# Patient Record
Sex: MALE | Race: WHITE | NOT HISPANIC OR LATINO
[De-identification: names, ages, dates, MRNs, and addresses within clinical notes are randomized per-mention and may not be internally consistent; named-entity substitution may affect disease eponyms.]

---

## 2018-11-20 ENCOUNTER — HOSPITAL (OUTPATIENT)
Dept: OTHER | Age: 58
End: 2018-11-20
Attending: INTERNAL MEDICINE

## 2018-11-20 ENCOUNTER — IMAGING SERVICES (OUTPATIENT)
Dept: OTHER | Age: 58
End: 2018-11-20

## 2018-11-28 ENCOUNTER — HOSPITAL (OUTPATIENT)
Dept: OTHER | Age: 58
End: 2018-11-28
Attending: INTERNAL MEDICINE

## 2018-11-28 ENCOUNTER — IMAGING SERVICES (OUTPATIENT)
Dept: OTHER | Age: 58
End: 2018-11-28

## 2021-05-10 DIAGNOSIS — R93.89 ABNORMAL CT SCAN, CHEST: Primary | ICD-10-CM

## 2021-05-10 DIAGNOSIS — R59.0 MEDIASTINAL LYMPHADENOPATHY: ICD-10-CM

## 2021-05-10 DIAGNOSIS — R91.8 LUNG INFILTRATE ON CT: ICD-10-CM

## 2021-05-13 ENCOUNTER — HOSPITAL ENCOUNTER (OUTPATIENT)
Dept: CT IMAGING | Age: 61
Discharge: HOME | End: 2021-05-13
Attending: INTERNAL MEDICINE

## 2021-05-13 DIAGNOSIS — R59.0 MEDIASTINAL LYMPHADENOPATHY: ICD-10-CM

## 2021-05-13 DIAGNOSIS — R91.8 LUNG INFILTRATE ON CT: ICD-10-CM

## 2021-05-13 DIAGNOSIS — R93.89 ABNORMAL CT SCAN, CHEST: ICD-10-CM

## 2021-05-13 LAB
CREAT SERPL-MCNC: 1 MG/DL (ref 0.67–1.17)
GFR SERPLBLD BASED ON 1.73 SQ M-ARVRAT: 81 ML/MIN/1.73M2

## 2021-05-13 PROCEDURE — 10002805 HB CONTRAST AGENT: Performed by: INTERNAL MEDICINE

## 2021-05-13 PROCEDURE — 71260 CT THORAX DX C+: CPT

## 2021-05-13 PROCEDURE — 82565 ASSAY OF CREATININE: CPT

## 2021-05-13 RX ADMIN — IOHEXOL 80 ML: 300 INJECTION, SOLUTION INTRAVENOUS at 17:13

## 2022-01-16 ENCOUNTER — APPOINTMENT (OUTPATIENT)
Dept: GENERAL RADIOLOGY | Facility: HOSPITAL | Age: 62
End: 2022-01-16
Attending: EMERGENCY MEDICINE
Payer: COMMERCIAL

## 2022-01-16 ENCOUNTER — HOSPITAL ENCOUNTER (EMERGENCY)
Facility: HOSPITAL | Age: 62
Discharge: HOME | End: 2022-01-16
Attending: EMERGENCY MEDICINE
Payer: COMMERCIAL

## 2022-01-16 VITALS
HEART RATE: 77 BPM | RESPIRATION RATE: 23 BRPM | HEIGHT: 71 IN | TEMPERATURE: 102 F | BODY MASS INDEX: 25.9 KG/M2 | WEIGHT: 185 LBS | SYSTOLIC BLOOD PRESSURE: 108 MMHG | DIASTOLIC BLOOD PRESSURE: 70 MMHG | OXYGEN SATURATION: 94 %

## 2022-01-16 DIAGNOSIS — U07.1 COVID-19 VIRUS INFECTION: Primary | ICD-10-CM

## 2022-01-16 DIAGNOSIS — R50.9 FEVER, UNSPECIFIED FEVER CAUSE: ICD-10-CM

## 2022-01-16 LAB
ANION GAP SERPL CALC-SCNC: 5 MMOL/L (ref 0–18)
BASOPHILS # BLD AUTO: 0.01 X10(3) UL (ref 0–0.2)
BASOPHILS NFR BLD AUTO: 0.2 %
BUN BLD-MCNC: 12 MG/DL (ref 7–18)
BUN/CREAT SERPL: 13 (ref 10–20)
CALCIUM BLD-MCNC: 8.5 MG/DL (ref 8.5–10.1)
CHLORIDE SERPL-SCNC: 106 MMOL/L (ref 98–112)
CO2 SERPL-SCNC: 29 MMOL/L (ref 21–32)
CREAT BLD-MCNC: 0.92 MG/DL
DEPRECATED RDW RBC AUTO: 39.5 FL (ref 35.1–46.3)
EOSINOPHIL # BLD AUTO: 0 X10(3) UL (ref 0–0.7)
EOSINOPHIL NFR BLD AUTO: 0 %
ERYTHROCYTE [DISTWIDTH] IN BLOOD BY AUTOMATED COUNT: 11.8 % (ref 11–15)
GLUCOSE BLD-MCNC: 119 MG/DL (ref 70–99)
HCT VFR BLD AUTO: 44.1 %
HGB BLD-MCNC: 14.7 G/DL
IMM GRANULOCYTES # BLD AUTO: 0 X10(3) UL (ref 0–1)
IMM GRANULOCYTES NFR BLD: 0 %
LYMPHOCYTES # BLD AUTO: 0.89 X10(3) UL (ref 1–4)
LYMPHOCYTES NFR BLD AUTO: 17.9 %
MCH RBC QN AUTO: 30.2 PG (ref 26–34)
MCHC RBC AUTO-ENTMCNC: 33.3 G/DL (ref 31–37)
MCV RBC AUTO: 90.7 FL
MONOCYTES # BLD AUTO: 0.38 X10(3) UL (ref 0.1–1)
MONOCYTES NFR BLD AUTO: 7.6 %
NEUTROPHILS # BLD AUTO: 3.69 X10 (3) UL (ref 1.5–7.7)
NEUTROPHILS # BLD AUTO: 3.69 X10(3) UL (ref 1.5–7.7)
NEUTROPHILS NFR BLD AUTO: 74.3 %
NT-PROBNP SERPL-MCNC: 97 PG/ML (ref ?–125)
OSMOLALITY SERPL CALC.SUM OF ELEC: 291 MOSM/KG (ref 275–295)
PLATELET # BLD AUTO: 234 10(3)UL (ref 150–450)
POTASSIUM SERPL-SCNC: 4.3 MMOL/L (ref 3.5–5.1)
RBC # BLD AUTO: 4.86 X10(6)UL
SODIUM SERPL-SCNC: 140 MMOL/L (ref 136–145)
TROPONIN I HIGH SENSITIVITY: 8 NG/L
WBC # BLD AUTO: 5 X10(3) UL (ref 4–11)

## 2022-01-16 PROCEDURE — 99284 EMERGENCY DEPT VISIT MOD MDM: CPT

## 2022-01-16 PROCEDURE — 93010 ELECTROCARDIOGRAM REPORT: CPT | Performed by: EMERGENCY MEDICINE

## 2022-01-16 PROCEDURE — 85025 COMPLETE CBC W/AUTO DIFF WBC: CPT | Performed by: EMERGENCY MEDICINE

## 2022-01-16 PROCEDURE — 80048 BASIC METABOLIC PNL TOTAL CA: CPT | Performed by: EMERGENCY MEDICINE

## 2022-01-16 PROCEDURE — 84484 ASSAY OF TROPONIN QUANT: CPT | Performed by: EMERGENCY MEDICINE

## 2022-01-16 PROCEDURE — 71045 X-RAY EXAM CHEST 1 VIEW: CPT | Performed by: EMERGENCY MEDICINE

## 2022-01-16 PROCEDURE — 36415 COLL VENOUS BLD VENIPUNCTURE: CPT

## 2022-01-16 PROCEDURE — 83880 ASSAY OF NATRIURETIC PEPTIDE: CPT | Performed by: EMERGENCY MEDICINE

## 2022-01-16 PROCEDURE — 93005 ELECTROCARDIOGRAM TRACING: CPT

## 2022-01-16 RX ORDER — ACETAMINOPHEN 500 MG
1000 TABLET ORAL ONCE
Status: COMPLETED | OUTPATIENT
Start: 2022-01-16 | End: 2022-01-16

## 2022-01-16 NOTE — ED PROVIDER NOTES
Patient Seen in: [facility] AND [facility] Emergency Department    History   Patient presents with:  Difficulty Breathing      HPI    57-year-old male presents the ER with complaints of low blood pressure and hypoxemia at home.   Patient states he has been having s normal.   Eyes:      Conjunctiva/sclera: Conjunctivae normal.      Pupils: Pupils are equal, round, and reactive to light. Cardiovascular:      Rate and Rhythm: Normal rate and regular rhythm. Heart sounds: Normal heart sounds.    Pulmonary:      Eff Rhythm  Reading: No ST deviation. Imaging Results Available and Reviewed while in ED: XR CHEST AP PORTABLE  (CPT=71045)    Result Date: 1/16/2022  CONCLUSION: No acute cardiopulmonary abnormality.     Dictated by (CST): Ming Medina MD on 1/16/2 worsen      Medications Prescribed:  There are no discharge medications for this patient.

## 2022-01-16 NOTE — ED INITIAL ASSESSMENT (HPI)
Patient arrives from home via [facility]. Patient states he took his blood pressure at home and it was \"low\". Dizziness started this AM.   EMS report /88 upon arrival. O2 sats 90%, O2 2L NC with WNL sats.    Patient on room air during as

## 2022-03-17 ENCOUNTER — TELEPHONE (OUTPATIENT)
Dept: GASTROENTEROLOGY | Facility: CLINIC | Age: 62
End: 2022-03-17

## 2022-03-17 ENCOUNTER — OFFICE VISIT (OUTPATIENT)
Dept: GASTROENTEROLOGY | Facility: CLINIC | Age: 62
End: 2022-03-17
Payer: COMMERCIAL

## 2022-03-17 VITALS
DIASTOLIC BLOOD PRESSURE: 78 MMHG | HEART RATE: 91 BPM | WEIGHT: 184 LBS | SYSTOLIC BLOOD PRESSURE: 118 MMHG | HEIGHT: 71 IN | BODY MASS INDEX: 25.76 KG/M2

## 2022-03-17 DIAGNOSIS — Z12.11 SCREENING FOR COLON CANCER: Primary | ICD-10-CM

## 2022-03-17 PROCEDURE — 99203 OFFICE O/P NEW LOW 30 MIN: CPT | Performed by: NURSE PRACTITIONER

## 2022-03-17 PROCEDURE — 3078F DIAST BP <80 MM HG: CPT | Performed by: NURSE PRACTITIONER

## 2022-03-17 PROCEDURE — 3008F BODY MASS INDEX DOCD: CPT | Performed by: NURSE PRACTITIONER

## 2022-03-17 PROCEDURE — 3074F SYST BP LT 130 MM HG: CPT | Performed by: NURSE PRACTITIONER

## 2022-03-17 RX ORDER — DOCUSATE SODIUM 100 MG/1
100 CAPSULE, LIQUID FILLED ORAL DAILY
COMMUNITY
Start: 2022-02-19

## 2022-03-17 RX ORDER — MONTELUKAST SODIUM 10 MG/1
10 TABLET ORAL DAILY
COMMUNITY
Start: 2022-01-26

## 2022-03-17 RX ORDER — FLUTICASONE FUROATE AND VILANTEROL TRIFENATATE 100; 25 UG/1; UG/1
POWDER RESPIRATORY (INHALATION)
COMMUNITY
Start: 2022-02-14

## 2022-03-17 RX ORDER — POLYETHYLENE GLYCOL 3350, SODIUM CHLORIDE, SODIUM BICARBONATE, POTASSIUM CHLORIDE 420; 11.2; 5.72; 1.48 G/4L; G/4L; G/4L; G/4L
POWDER, FOR SOLUTION ORAL
Qty: 4000 ML | Refills: 0 | Status: SHIPPED | OUTPATIENT
Start: 2022-03-17

## 2022-03-17 NOTE — TELEPHONE ENCOUNTER
Scheduled for:  Colonoscopy 94452  Provider Name:  Dr Praneeth Sidhu  Date:  08/17/2022  Location:  [facility]  Sedation:  mac  Time:  5949 (pt is aware to arrive at [facility])    Prep:  MAC  Meds/Allergies Reconciled?:  Karina/BLUE reviewed. Diagnosis with codes:  Colon cancer screening z12.11  Was patient informed to call insurance with codes (Y/N):  Y     Referral sent?:  NA  [facility] or [facility] Th  notified?:  I sent an electronic request to Endo Scheduling and received a confirmation today. Medication Orders:  Pt is aware to NOT take iron pills, herbal meds and diet supplements for 7 days before exam. Also to NOT take any form of alcohol, recreational drugs and any forms of ED meds 24 hours before exam.     Misc Orders:       Further instructions given by staff:  I discussed the prep intructions with the patient in office which  He verbally understood. Copy of instructions was handed to patient as well. Patient was also advised he will receive a call from PAT nurse 72-24 hours prior procedure to schedule Covid test done.

## 2022-03-17 NOTE — PATIENT INSTRUCTIONS
-Schedule colonoscopy w/ Dr. Annabelle Husain or Dr. Lion Phelps with IV twilight or MAC  Dx: screening, [address] [facility]   -Eligible for NE: Yes r/t BMI <40  -Prep: Split dose Colyte/TriLyte or equivalent  -Anti-platelets and anti-coagulants: none  -Diabetes meds: none    ** If MAC:    - HOLD ACE/ARBs the night before and/or the day of the procedure(s) - N/A   - NO alcohol, recreational drugs nor erectile dysfunction medications 24 hours before procedure(s)   - NO herbal supplements or weight loss medications (phentermine/Vyvanse/Adderall)  x 7 days prior to the procedure(s)    ** If MAC @ [facility] or IV twilight - continue all medications as prescribed    ** COVID-19 testing required 72 hours prior to procedure

## 2022-05-25 ENCOUNTER — HOSPITAL ENCOUNTER (EMERGENCY)
Facility: HOSPITAL | Age: 62
Discharge: HOME | End: 2022-05-25
Attending: EMERGENCY MEDICINE
Payer: COMMERCIAL

## 2022-05-25 ENCOUNTER — APPOINTMENT (OUTPATIENT)
Dept: CT IMAGING | Facility: HOSPITAL | Age: 62
End: 2022-05-25
Attending: EMERGENCY MEDICINE
Payer: COMMERCIAL

## 2022-05-25 VITALS
RESPIRATION RATE: 17 BRPM | HEART RATE: 64 BPM | OXYGEN SATURATION: 99 % | DIASTOLIC BLOOD PRESSURE: 76 MMHG | SYSTOLIC BLOOD PRESSURE: 138 MMHG | BODY MASS INDEX: 25 KG/M2 | WEIGHT: 180 LBS | TEMPERATURE: 97 F

## 2022-05-25 DIAGNOSIS — N20.1 URETEROLITHIASIS: Primary | ICD-10-CM

## 2022-05-25 LAB
ANION GAP SERPL CALC-SCNC: 8 MMOL/L (ref 0–18)
BASOPHILS # BLD AUTO: 0.07 X10(3) UL (ref 0–0.2)
BASOPHILS NFR BLD AUTO: 0.7 %
BILIRUB UR QL: NEGATIVE
BUN BLD-MCNC: 20 MG/DL (ref 7–18)
BUN/CREAT SERPL: 16 (ref 10–20)
CALCIUM BLD-MCNC: 9.5 MG/DL (ref 8.5–10.1)
CHLORIDE SERPL-SCNC: 106 MMOL/L (ref 98–112)
CLARITY UR: CLEAR
CO2 SERPL-SCNC: 25 MMOL/L (ref 21–32)
COLOR UR: YELLOW
CREAT BLD-MCNC: 1.25 MG/DL
DEPRECATED RDW RBC AUTO: 41.2 FL (ref 35.1–46.3)
EOSINOPHIL # BLD AUTO: 0.3 X10(3) UL (ref 0–0.7)
EOSINOPHIL NFR BLD AUTO: 3 %
ERYTHROCYTE [DISTWIDTH] IN BLOOD BY AUTOMATED COUNT: 12 % (ref 11–15)
GLUCOSE BLD-MCNC: 106 MG/DL (ref 70–99)
GLUCOSE UR-MCNC: NEGATIVE MG/DL
HCT VFR BLD AUTO: 40.8 %
HGB BLD-MCNC: 13.6 G/DL
HGB UR QL STRIP.AUTO: NEGATIVE
IMM GRANULOCYTES # BLD AUTO: 0.02 X10(3) UL (ref 0–1)
IMM GRANULOCYTES NFR BLD: 0.2 %
KETONES UR-MCNC: 20 MG/DL
LEUKOCYTE ESTERASE UR QL STRIP.AUTO: NEGATIVE
LYMPHOCYTES # BLD AUTO: 1.65 X10(3) UL (ref 1–4)
LYMPHOCYTES NFR BLD AUTO: 16.4 %
MCH RBC QN AUTO: 31.1 PG (ref 26–34)
MCHC RBC AUTO-ENTMCNC: 33.3 G/DL (ref 31–37)
MCV RBC AUTO: 93.2 FL
MONOCYTES # BLD AUTO: 0.76 X10(3) UL (ref 0.1–1)
MONOCYTES NFR BLD AUTO: 7.6 %
NEUTROPHILS # BLD AUTO: 7.24 X10 (3) UL (ref 1.5–7.7)
NEUTROPHILS # BLD AUTO: 7.24 X10(3) UL (ref 1.5–7.7)
NEUTROPHILS NFR BLD AUTO: 72.1 %
NITRITE UR QL STRIP.AUTO: NEGATIVE
OSMOLALITY SERPL CALC.SUM OF ELEC: 291 MOSM/KG (ref 275–295)
PH UR: 8 [PH] (ref 5–8)
PLATELET # BLD AUTO: 254 10(3)UL (ref 150–450)
POTASSIUM SERPL-SCNC: 4 MMOL/L (ref 3.5–5.1)
PROT UR-MCNC: NEGATIVE MG/DL
RBC # BLD AUTO: 4.38 X10(6)UL
RBC #/AREA URNS AUTO: >10 /HPF
SODIUM SERPL-SCNC: 139 MMOL/L (ref 136–145)
SP GR UR STRIP: 1.01 (ref 1–1.03)
UROBILINOGEN UR STRIP-ACNC: <2
VIT C UR-MCNC: 40 MG/DL
WBC # BLD AUTO: 10 X10(3) UL (ref 4–11)

## 2022-05-25 PROCEDURE — 99284 EMERGENCY DEPT VISIT MOD MDM: CPT

## 2022-05-25 PROCEDURE — 96361 HYDRATE IV INFUSION ADD-ON: CPT

## 2022-05-25 PROCEDURE — 96374 THER/PROPH/DIAG INJ IV PUSH: CPT

## 2022-05-25 PROCEDURE — 81001 URINALYSIS AUTO W/SCOPE: CPT | Performed by: EMERGENCY MEDICINE

## 2022-05-25 PROCEDURE — 85025 COMPLETE CBC W/AUTO DIFF WBC: CPT | Performed by: EMERGENCY MEDICINE

## 2022-05-25 PROCEDURE — 80048 BASIC METABOLIC PNL TOTAL CA: CPT | Performed by: EMERGENCY MEDICINE

## 2022-05-25 PROCEDURE — 74176 CT ABD & PELVIS W/O CONTRAST: CPT | Performed by: EMERGENCY MEDICINE

## 2022-05-25 RX ORDER — ONDANSETRON 4 MG/1
4 TABLET, ORALLY DISINTEGRATING ORAL EVERY 4 HOURS PRN
Qty: 10 TABLET | Refills: 0 | Status: SHIPPED | OUTPATIENT
Start: 2022-05-25 | End: 2022-06-01

## 2022-05-25 RX ORDER — TAMSULOSIN HYDROCHLORIDE 0.4 MG/1
0.4 CAPSULE ORAL DAILY
Qty: 7 CAPSULE | Refills: 0 | Status: SHIPPED | OUTPATIENT
Start: 2022-05-25 | End: 2022-06-01

## 2022-05-25 RX ORDER — IBUPROFEN 600 MG/1
600 TABLET ORAL EVERY 8 HOURS PRN
Qty: 30 TABLET | Refills: 0 | Status: SHIPPED | OUTPATIENT
Start: 2022-05-25 | End: 2022-06-01

## 2022-05-25 RX ORDER — KETOROLAC TROMETHAMINE 15 MG/ML
15 INJECTION, SOLUTION INTRAMUSCULAR; INTRAVENOUS ONCE
Status: COMPLETED | OUTPATIENT
Start: 2022-05-25 | End: 2022-05-25

## 2022-05-25 RX ORDER — HYDROCODONE BITARTRATE AND ACETAMINOPHEN 5; 325 MG/1; MG/1
1 TABLET ORAL EVERY 6 HOURS PRN
Qty: 10 TABLET | Refills: 0 | Status: SHIPPED | OUTPATIENT
Start: 2022-05-25 | End: 2022-06-01

## 2022-05-25 RX ORDER — MORPHINE SULFATE 4 MG/ML
4 INJECTION, SOLUTION INTRAMUSCULAR; INTRAVENOUS ONCE
Status: DISCONTINUED | OUTPATIENT
Start: 2022-05-25 | End: 2022-05-25

## 2022-06-18 ENCOUNTER — LAB ENCOUNTER (OUTPATIENT)
Dept: LAB | Facility: HOSPITAL | Age: 62
End: 2022-06-18
Attending: UROLOGY
Payer: COMMERCIAL

## 2022-06-18 DIAGNOSIS — Z12.5 SPECIAL SCREENING FOR MALIGNANT NEOPLASM OF PROSTATE: Primary | ICD-10-CM

## 2022-06-18 DIAGNOSIS — N20.0 URIC ACID NEPHROLITHIASIS: ICD-10-CM

## 2022-06-18 LAB
ANION GAP SERPL CALC-SCNC: 7 MMOL/L (ref 0–18)
BASOPHILS # BLD AUTO: 0.05 X10(3) UL (ref 0–0.2)
BASOPHILS NFR BLD AUTO: 0.8 %
BUN BLD-MCNC: 30 MG/DL (ref 7–18)
BUN/CREAT SERPL: 28 (ref 10–20)
CALCIUM BLD-MCNC: 9.3 MG/DL (ref 8.5–10.1)
CHLORIDE SERPL-SCNC: 108 MMOL/L (ref 98–112)
CO2 SERPL-SCNC: 27 MMOL/L (ref 21–32)
COMPLEXED PSA SERPL-MCNC: 0.88 NG/ML (ref ?–4)
CREAT BLD-MCNC: 1.07 MG/DL
DEPRECATED RDW RBC AUTO: 41.6 FL (ref 35.1–46.3)
EOSINOPHIL # BLD AUTO: 0.26 X10(3) UL (ref 0–0.7)
EOSINOPHIL NFR BLD AUTO: 4.2 %
ERYTHROCYTE [DISTWIDTH] IN BLOOD BY AUTOMATED COUNT: 11.9 % (ref 11–15)
FASTING STATUS PATIENT QL REPORTED: YES
GLUCOSE BLD-MCNC: 114 MG/DL (ref 70–99)
HCT VFR BLD AUTO: 42.7 %
HGB BLD-MCNC: 14 G/DL
IMM GRANULOCYTES # BLD AUTO: 0.01 X10(3) UL (ref 0–1)
IMM GRANULOCYTES NFR BLD: 0.2 %
LYMPHOCYTES # BLD AUTO: 2.12 X10(3) UL (ref 1–4)
LYMPHOCYTES NFR BLD AUTO: 34.3 %
MCH RBC QN AUTO: 30.8 PG (ref 26–34)
MCHC RBC AUTO-ENTMCNC: 32.8 G/DL (ref 31–37)
MCV RBC AUTO: 94.1 FL
MONOCYTES # BLD AUTO: 0.48 X10(3) UL (ref 0.1–1)
MONOCYTES NFR BLD AUTO: 7.8 %
NEUTROPHILS # BLD AUTO: 3.26 X10 (3) UL (ref 1.5–7.7)
NEUTROPHILS # BLD AUTO: 3.26 X10(3) UL (ref 1.5–7.7)
NEUTROPHILS NFR BLD AUTO: 52.7 %
OSMOLALITY SERPL CALC.SUM OF ELEC: 301 MOSM/KG (ref 275–295)
PLATELET # BLD AUTO: 248 10(3)UL (ref 150–450)
POTASSIUM SERPL-SCNC: 3.9 MMOL/L (ref 3.5–5.1)
RBC # BLD AUTO: 4.54 X10(6)UL
SODIUM SERPL-SCNC: 142 MMOL/L (ref 136–145)
WBC # BLD AUTO: 6.2 X10(3) UL (ref 4–11)

## 2022-06-18 PROCEDURE — 85025 COMPLETE CBC W/AUTO DIFF WBC: CPT

## 2022-06-18 PROCEDURE — 36415 COLL VENOUS BLD VENIPUNCTURE: CPT

## 2022-06-18 PROCEDURE — 80048 BASIC METABOLIC PNL TOTAL CA: CPT

## 2022-06-29 ENCOUNTER — HOSPITAL ENCOUNTER (OUTPATIENT)
Dept: ULTRASOUND IMAGING | Age: 62
Discharge: HOME | End: 2022-06-29
Attending: UROLOGY
Payer: COMMERCIAL

## 2022-06-29 ENCOUNTER — HOSPITAL ENCOUNTER (OUTPATIENT)
Dept: GENERAL RADIOLOGY | Age: 62
Discharge: HOME | End: 2022-06-29
Attending: UROLOGY
Payer: COMMERCIAL

## 2022-06-29 DIAGNOSIS — N20.0 KIDNEY STONE: ICD-10-CM

## 2022-06-29 PROCEDURE — 76775 US EXAM ABDO BACK WALL LIM: CPT | Performed by: UROLOGY

## 2022-06-29 PROCEDURE — 74018 RADEX ABDOMEN 1 VIEW: CPT | Performed by: UROLOGY

## 2022-08-17 ENCOUNTER — ANESTHESIA EVENT (OUTPATIENT)
Dept: ENDOSCOPY | Facility: HOSPITAL | Age: 62
End: 2022-08-17
Payer: COMMERCIAL

## 2022-08-17 ENCOUNTER — HOSPITAL ENCOUNTER (OUTPATIENT)
Facility: HOSPITAL | Age: 62
Setting detail: HOSPITAL OUTPATIENT SURGERY
Discharge: HOME | End: 2022-08-17
Attending: INTERNAL MEDICINE | Admitting: INTERNAL MEDICINE
Payer: COMMERCIAL

## 2022-08-17 ENCOUNTER — ANESTHESIA (OUTPATIENT)
Dept: ENDOSCOPY | Facility: HOSPITAL | Age: 62
End: 2022-08-17
Payer: COMMERCIAL

## 2022-08-17 VITALS
SYSTOLIC BLOOD PRESSURE: 121 MMHG | BODY MASS INDEX: 25.2 KG/M2 | DIASTOLIC BLOOD PRESSURE: 83 MMHG | TEMPERATURE: 97 F | RESPIRATION RATE: 18 BRPM | HEART RATE: 67 BPM | HEIGHT: 71 IN | WEIGHT: 180 LBS | OXYGEN SATURATION: 97 %

## 2022-08-17 DIAGNOSIS — Z12.11 SCREENING FOR COLON CANCER: ICD-10-CM

## 2022-08-17 PROBLEM — J45.50 SEVERE PERSISTENT ASTHMA  (CMD): Status: ACTIVE | Noted: 2022-08-17

## 2022-08-17 PROBLEM — J32.4 CHRONIC PANSINUSITIS: Status: ACTIVE | Noted: 2022-08-17

## 2022-08-17 PROBLEM — M19.90 OSTEOARTHRITIS: Status: ACTIVE | Noted: 2022-08-17

## 2022-08-17 PROBLEM — R59.0 MEDIASTINAL LYMPHADENOPATHY: Status: ACTIVE | Noted: 2022-08-17

## 2022-08-17 PROBLEM — Z91.09 ENVIRONMENTAL ALLERGIES: Status: ACTIVE | Noted: 2022-08-17

## 2022-08-17 PROBLEM — E78.5 HYPERLIPIDEMIA: Status: ACTIVE | Noted: 2022-08-17

## 2022-08-17 PROBLEM — I10 HYPERTENSION: Status: ACTIVE | Noted: 2022-08-17

## 2022-08-17 PROBLEM — L71.9 ROSACEA: Status: ACTIVE | Noted: 2022-08-17

## 2022-08-17 PROBLEM — R76.8 ELEVATED IGE LEVEL: Status: ACTIVE | Noted: 2022-08-17

## 2022-08-17 PROBLEM — R93.89 ABNORMAL CT OF THE CHEST: Status: ACTIVE | Noted: 2022-08-17

## 2022-08-17 PROCEDURE — 45385 COLONOSCOPY W/LESION REMOVAL: CPT | Performed by: INTERNAL MEDICINE

## 2022-08-17 PROCEDURE — 0DBN8ZX EXCISION OF SIGMOID COLON, VIA NATURAL OR ARTIFICIAL OPENING ENDOSCOPIC, DIAGNOSTIC: ICD-10-PCS | Performed by: INTERNAL MEDICINE

## 2022-08-17 PROCEDURE — 0DBP8ZX EXCISION OF RECTUM, VIA NATURAL OR ARTIFICIAL OPENING ENDOSCOPIC, DIAGNOSTIC: ICD-10-PCS | Performed by: INTERNAL MEDICINE

## 2022-08-17 RX ORDER — MONTELUKAST SODIUM 10 MG/1
10 TABLET ORAL DAILY
COMMUNITY
End: 2023-01-04 | Stop reason: SDUPTHER

## 2022-08-17 RX ORDER — SODIUM CHLORIDE, SODIUM LACTATE, POTASSIUM CHLORIDE, CALCIUM CHLORIDE 600; 310; 30; 20 MG/100ML; MG/100ML; MG/100ML; MG/100ML
INJECTION, SOLUTION INTRAVENOUS CONTINUOUS
Status: DISCONTINUED | OUTPATIENT
Start: 2022-08-17 | End: 2022-08-17

## 2022-08-17 RX ORDER — MULTIVIT WITH MINERALS/LUTEIN
1000 TABLET ORAL 2 TIMES DAILY
COMMUNITY

## 2022-08-17 RX ORDER — ALBUTEROL SULFATE 90 UG/1
2 AEROSOL, METERED RESPIRATORY (INHALATION) EVERY 4 HOURS PRN
COMMUNITY
End: 2022-11-14 | Stop reason: ALTCHOICE

## 2022-08-17 RX ORDER — FLUTICASONE FUROATE AND VILANTEROL 100; 25 UG/1; UG/1
1 POWDER RESPIRATORY (INHALATION) DAILY
COMMUNITY
End: 2023-01-04 | Stop reason: SDUPTHER

## 2022-08-17 RX ORDER — CETIRIZINE HYDROCHLORIDE 10 MG/1
10 TABLET ORAL DAILY
COMMUNITY

## 2022-08-17 RX ORDER — NALOXONE HYDROCHLORIDE 0.4 MG/ML
80 INJECTION, SOLUTION INTRAMUSCULAR; INTRAVENOUS; SUBCUTANEOUS AS NEEDED
Status: DISCONTINUED | OUTPATIENT
Start: 2022-08-17 | End: 2022-08-17

## 2022-08-17 RX ORDER — FLUTICASONE PROPIONATE 50 MCG
1 SPRAY, SUSPENSION (ML) NASAL 2 TIMES DAILY PRN
COMMUNITY

## 2022-08-17 RX ORDER — LIDOCAINE HYDROCHLORIDE 10 MG/ML
INJECTION, SOLUTION EPIDURAL; INFILTRATION; INTRACAUDAL; PERINEURAL AS NEEDED
Status: DISCONTINUED | OUTPATIENT
Start: 2022-08-17 | End: 2022-08-17 | Stop reason: SURG

## 2022-08-17 RX ADMIN — SODIUM CHLORIDE, SODIUM LACTATE, POTASSIUM CHLORIDE, CALCIUM CHLORIDE: 600; 310; 30; 20 INJECTION, SOLUTION INTRAVENOUS at 08:45:00

## 2022-08-17 RX ADMIN — SODIUM CHLORIDE, SODIUM LACTATE, POTASSIUM CHLORIDE, CALCIUM CHLORIDE: 600; 310; 30; 20 INJECTION, SOLUTION INTRAVENOUS at 08:20:00

## 2022-08-17 RX ADMIN — LIDOCAINE HYDROCHLORIDE 25 MG: 10 INJECTION, SOLUTION EPIDURAL; INFILTRATION; INTRACAUDAL; PERINEURAL at 08:22:00

## 2022-08-17 NOTE — OPERATIVE REPORT
[facility] Endoscopy Report      Date of Procedure:  08/17/22      Preoperative Diagnosis:  1. Colorectal cancer screening  2. Family history of intestinal cancer      Postoperative Diagnosis:  1. Colon polyps  2. Sigmoid colon diverticulosis      Procedure:    Colonoscopy with polypectomy      Surgeon:  Alexandria Light M.D. Anesthesia:  Monitored anesthesia care  Cecal withdrawal time: 15 minutes  EBL:  Insignificant      Brief History: This is a 58year old male who presents for a screening colonoscopy in the setting of a family history of \"intestinal cancer\" in his father. The patient's last colonoscopy was a little over 5 years prior. He has been asymptomatic from a lower gastrointestinal tract standpoint. Technique:  After informed consent, the patient was placed in the left lateral recumbent position. Digital rectal examination revealed no palpable intraluminal abnormalities. An Olympus variable stiffness 190 series HD colonoscope was inserted into the rectum and advanced under direct vision by following the lumen to the terminal ileum. The colon was examined upon withdrawal in the left lateral recumbent position. Findings:  The preparation of the colon was excellent. The terminal ileum was examined for 5 cm and visually normal.  The ileocecal valve was well preserved. The visualized colonic mucosa from the cecum to the anal verge was normal with an intact vascular pattern. There were #2 polyps seen within the colon which removed as follows:    1. In the mid sigmoid colon there was a 3-4 mm sessile polyp which was cold snare excised and retrieved. 2.  In the proximal rectum there was a 3 mm sessile polyp which was cold snare excised and retrieved. Both specimens were placed in the same container. Inspection of both sites revealed no evidence of ongoing bleeding. There were several small diverticula seen in the sigmoid colon without signs of complication. There were no other colonic polyps, mass lesions, vascular anomalies or signs of inflammation seen. Retroflexion in the rectum revealed no abnormalities. The procedure was well tolerated without immediate complication. Impression:  1. Diminutive rectosigmoid polyps  2. Uncomplicated sigmoid colon diverticulosis    Recommendations:  1. High-fiber diet. 2.  Follow-up biopsy results. 3.  Probable screening/surveillance colonoscopy in 5 years unless polyps harbor advanced histology.         Leonardo Dobson MD  8/17/2022

## 2022-08-19 ENCOUNTER — TELEPHONE (OUTPATIENT)
Dept: GASTROENTEROLOGY | Facility: CLINIC | Age: 62
End: 2022-08-19

## 2022-08-19 NOTE — TELEPHONE ENCOUNTER
Health maintenance updated. Last colonoscopy done 8/17/22. 5 year recall placed into Pt Outreach, next due on 8/17/27 per Dr. Donna Joshua.

## 2022-08-19 NOTE — TELEPHONE ENCOUNTER
----- Message from Tanna Palma MD sent at 8/19/2022  5:54 PM CDT -----  I spoke to the patient. He is feeling well. He had #2 subcentimeter hyperplastic polyps removed. I discussed the significance. I have recommended a high-fiber diet for diverticulosis and based on family history a screening colonoscopy in 5 years. GI RNs: Please enter colonoscopy recall for 5 years.

## 2022-09-28 ENCOUNTER — OFFICE VISIT (OUTPATIENT)
Dept: AUDIOLOGY | Facility: CLINIC | Age: 62
End: 2022-09-28

## 2022-09-28 ENCOUNTER — OFFICE VISIT (OUTPATIENT)
Dept: OTOLARYNGOLOGY | Facility: CLINIC | Age: 62
End: 2022-09-28

## 2022-09-28 VITALS — WEIGHT: 180 LBS | HEIGHT: 71 IN | BODY MASS INDEX: 25.2 KG/M2

## 2022-09-28 DIAGNOSIS — H93.19 TINNITUS, UNSPECIFIED LATERALITY: ICD-10-CM

## 2022-09-28 DIAGNOSIS — J34.2 NASAL SEPTAL DEVIATION: ICD-10-CM

## 2022-09-28 DIAGNOSIS — H93.13 TINNITUS OF BOTH EARS: Primary | ICD-10-CM

## 2022-09-28 DIAGNOSIS — H91.93 BILATERAL HEARING LOSS, UNSPECIFIED HEARING LOSS TYPE: ICD-10-CM

## 2022-09-28 DIAGNOSIS — H90.3 SENSORINEURAL HEARING LOSS, BILATERAL: ICD-10-CM

## 2022-09-28 DIAGNOSIS — J32.9 PURULENT POSTNASAL DRAINAGE: ICD-10-CM

## 2022-09-28 PROCEDURE — 92567 TYMPANOMETRY: CPT | Performed by: AUDIOLOGIST

## 2022-09-28 PROCEDURE — 92557 COMPREHENSIVE HEARING TEST: CPT | Performed by: AUDIOLOGIST

## 2022-09-28 PROCEDURE — 3008F BODY MASS INDEX DOCD: CPT | Performed by: SPECIALIST

## 2022-09-28 PROCEDURE — 99213 OFFICE O/P EST LOW 20 MIN: CPT | Performed by: SPECIALIST

## 2022-09-28 RX ORDER — ALBUTEROL SULFATE 90 UG/1
2 AEROSOL, METERED RESPIRATORY (INHALATION) EVERY 4 HOURS PRN
COMMUNITY

## 2022-09-28 RX ORDER — FLUTICASONE PROPIONATE 50 MCG
2 SPRAY, SUSPENSION (ML) NASAL DAILY
Qty: 15 ML | Refills: 3 | Status: SHIPPED | OUTPATIENT
Start: 2022-09-28

## 2022-09-28 RX ORDER — CEFDINIR 300 MG/1
300 CAPSULE ORAL 2 TIMES DAILY
Qty: 28 CAPSULE | Refills: 0 | Status: SHIPPED | OUTPATIENT
Start: 2022-09-28

## 2022-09-28 NOTE — PATIENT INSTRUCTIONS
Audiogram was done to better assess your hearing loss. Your audiogram shows: Lateral mid to high-frequency mild to moderate hearing loss. Word discrimination is excellent in both ears. I placed you on a trial of cefdinir and Flonase for your sinus problems. Follow-up in 3 weeks time, sooner if problems.

## 2022-09-29 PROBLEM — H90.3 SENSORINEURAL HEARING LOSS, BILATERAL: Status: ACTIVE | Noted: 2022-09-29

## 2022-10-19 ENCOUNTER — OFFICE VISIT (OUTPATIENT)
Dept: OTOLARYNGOLOGY | Facility: CLINIC | Age: 62
End: 2022-10-19
Payer: COMMERCIAL

## 2022-10-19 DIAGNOSIS — G47.33 OBSTRUCTIVE SLEEP APNEA: ICD-10-CM

## 2022-10-19 DIAGNOSIS — J32.9 PURULENT POSTNASAL DRAINAGE: Primary | ICD-10-CM

## 2022-10-19 DIAGNOSIS — H90.3 SENSORINEURAL HEARING LOSS, BILATERAL: ICD-10-CM

## 2022-10-19 PROCEDURE — 99213 OFFICE O/P EST LOW 20 MIN: CPT | Performed by: SPECIALIST

## 2022-10-19 NOTE — PATIENT INSTRUCTIONS
You have a resolution of your sinus infection. Consider repeat sleep study for your sleep apnea. Consider binaural hearing aids.

## 2022-11-14 ENCOUNTER — OFFICE VISIT (OUTPATIENT)
Dept: PULMONOLOGY | Age: 62
End: 2022-11-14

## 2022-11-14 VITALS
DIASTOLIC BLOOD PRESSURE: 61 MMHG | HEIGHT: 71 IN | HEART RATE: 89 BPM | BODY MASS INDEX: 23.95 KG/M2 | RESPIRATION RATE: 16 BRPM | OXYGEN SATURATION: 98 % | WEIGHT: 171.08 LBS | SYSTOLIC BLOOD PRESSURE: 112 MMHG | TEMPERATURE: 98.2 F

## 2022-11-14 DIAGNOSIS — R76.8 ELEVATED IGE LEVEL: ICD-10-CM

## 2022-11-14 DIAGNOSIS — Z91.09 ENVIRONMENTAL ALLERGIES: ICD-10-CM

## 2022-11-14 DIAGNOSIS — M19.90 ARTHRITIS: ICD-10-CM

## 2022-11-14 DIAGNOSIS — J32.9 CHRONIC RHINOSINUSITIS: ICD-10-CM

## 2022-11-14 DIAGNOSIS — J45.50 SEVERE PERSISTENT ASTHMA WITHOUT COMPLICATION: Primary | ICD-10-CM

## 2022-11-14 PROCEDURE — 3078F DIAST BP <80 MM HG: CPT | Performed by: INTERNAL MEDICINE

## 2022-11-14 PROCEDURE — 99213 OFFICE O/P EST LOW 20 MIN: CPT | Performed by: INTERNAL MEDICINE

## 2022-11-14 PROCEDURE — 3074F SYST BP LT 130 MM HG: CPT | Performed by: INTERNAL MEDICINE

## 2022-11-14 RX ORDER — FLUTICASONE PROPIONATE 50 MCG
2 SPRAY, SUSPENSION (ML) NASAL
COMMUNITY
Start: 2022-09-28

## 2022-11-14 ASSESSMENT — PAIN SCALES - GENERAL: PAINLEVEL: 0

## 2022-12-27 RX ORDER — FLUTICASONE PROPIONATE 50 MCG
SPRAY, SUSPENSION (ML) NASAL
Qty: 48 ML | Refills: 1 | Status: SHIPPED | OUTPATIENT
Start: 2022-12-27

## 2023-01-04 ENCOUNTER — TELEPHONE (OUTPATIENT)
Dept: PULMONOLOGY | Age: 63
End: 2023-01-04

## 2023-01-04 DIAGNOSIS — J45.50 SEVERE PERSISTENT ASTHMA WITHOUT COMPLICATION: Primary | ICD-10-CM

## 2023-01-04 RX ORDER — FLUTICASONE FUROATE AND VILANTEROL 100; 25 UG/1; UG/1
1 POWDER RESPIRATORY (INHALATION) DAILY
Qty: 60 EACH | Refills: 3 | Status: SHIPPED | OUTPATIENT
Start: 2023-01-04 | End: 2023-01-11 | Stop reason: ALTCHOICE

## 2023-01-04 RX ORDER — MONTELUKAST SODIUM 10 MG/1
10 TABLET ORAL DAILY
Qty: 30 TABLET | Refills: 11 | Status: SHIPPED | OUTPATIENT
Start: 2023-01-04 | End: 2023-05-15 | Stop reason: SDUPTHER

## 2023-01-05 ENCOUNTER — TELEPHONE (OUTPATIENT)
Dept: PULMONOLOGY | Age: 63
End: 2023-01-05

## 2023-01-11 RX ORDER — FLUTICASONE PROPIONATE AND SALMETEROL 250; 50 UG/1; UG/1
1 POWDER RESPIRATORY (INHALATION)
Qty: 3 EACH | Refills: 3 | Status: SHIPPED | OUTPATIENT
Start: 2023-01-11 | End: 2023-05-08 | Stop reason: SDUPTHER

## 2023-01-13 ENCOUNTER — TELEPHONE (OUTPATIENT)
Dept: PULMONOLOGY | Age: 63
End: 2023-01-13

## 2023-01-13 DIAGNOSIS — J45.51 SEVERE PERSISTENT ASTHMA WITH ACUTE EXACERBATION: Primary | ICD-10-CM

## 2023-01-13 DIAGNOSIS — J45.50 SEVERE PERSISTENT ASTHMA WITHOUT COMPLICATION: ICD-10-CM

## 2023-01-13 RX ORDER — PREDNISONE 10 MG/1
40 TABLET ORAL DAILY
Qty: 20 TABLET | Refills: 0 | Status: SHIPPED | OUTPATIENT
Start: 2023-01-13 | End: 2023-01-18

## 2023-02-14 ENCOUNTER — HOSPITAL ENCOUNTER (OUTPATIENT)
Dept: CT IMAGING | Age: 63
Discharge: HOME | End: 2023-02-14
Attending: UROLOGY
Payer: COMMERCIAL

## 2023-02-14 ENCOUNTER — HOSPITAL ENCOUNTER (OUTPATIENT)
Dept: ULTRASOUND IMAGING | Facility: HOSPITAL | Age: 63
Discharge: HOME | End: 2023-02-14
Payer: COMMERCIAL

## 2023-02-14 DIAGNOSIS — N20.0 KIDNEY STONE: ICD-10-CM

## 2023-02-14 DIAGNOSIS — R94.5 ABNORMAL FINDING ON LIVER FUNCTION: ICD-10-CM

## 2023-02-14 LAB
CREAT BLD-MCNC: 1.2 MG/DL
GFR SERPLBLD BASED ON 1.73 SQ M-ARVRAT: 68 ML/MIN/1.73M2 (ref 60–?)

## 2023-02-14 PROCEDURE — 76705 ECHO EXAM OF ABDOMEN: CPT

## 2023-02-14 PROCEDURE — 82565 ASSAY OF CREATININE: CPT

## 2023-02-14 PROCEDURE — 76705 ECHO EXAM OF ABDOMEN: CPT | Performed by: UROLOGY

## 2023-02-14 PROCEDURE — 74178 CT ABD&PLV WO CNTR FLWD CNTR: CPT | Performed by: UROLOGY

## 2023-05-07 DIAGNOSIS — J45.50 SEVERE PERSISTENT ASTHMA WITHOUT COMPLICATION: ICD-10-CM

## 2023-05-08 RX ORDER — FLUTICASONE PROPIONATE AND SALMETEROL 250; 50 UG/1; UG/1
1 POWDER RESPIRATORY (INHALATION)
Qty: 3 EACH | Refills: 3 | Status: SHIPPED | OUTPATIENT
Start: 2023-05-08 | End: 2023-05-15 | Stop reason: ALTCHOICE

## 2023-05-08 RX ORDER — FLUTICASONE FUROATE AND VILANTEROL TRIFENATATE 100; 25 UG/1; UG/1
POWDER RESPIRATORY (INHALATION)
Qty: 60 EACH | Refills: 3 | Status: SHIPPED | OUTPATIENT
Start: 2023-05-08 | End: 2023-05-08 | Stop reason: ALTCHOICE

## 2023-05-15 RX ORDER — MONTELUKAST SODIUM 10 MG/1
10 TABLET ORAL DAILY
Qty: 90 TABLET | Refills: 3 | Status: SHIPPED | OUTPATIENT
Start: 2023-05-15

## 2023-05-15 RX ORDER — FLUTICASONE FUROATE AND VILANTEROL 100; 25 UG/1; UG/1
1 POWDER RESPIRATORY (INHALATION)
Qty: 3 EACH | Refills: 1 | Status: SHIPPED | OUTPATIENT
Start: 2023-05-15

## 2023-05-18 ENCOUNTER — APPOINTMENT (OUTPATIENT)
Dept: PULMONOLOGY | Age: 63
End: 2023-05-18

## 2023-06-23 ENCOUNTER — APPOINTMENT (OUTPATIENT)
Dept: PULMONOLOGY | Age: 63
End: 2023-06-23

## 2023-09-07 RX ORDER — FLUTICASONE PROPIONATE 50 MCG
2 SPRAY, SUSPENSION (ML) NASAL DAILY
Qty: 48 ML | Refills: 1 | Status: SHIPPED | OUTPATIENT
Start: 2023-09-07

## 2023-09-07 NOTE — TELEPHONE ENCOUNTER
Continues on Eliquis    No recent episodes of atrial fibrillation This refill request is being sent to the provider for the following reason:  []Patient has not had an appointment within the past 12 months but has made an appointment on: ___  [x]Medication is not within protocol  []Patient did not complete follow up recommendations  []Other: ___    Oly Galindo,     Please review patient's medication refill request. LOV 10/19/22

## 2023-09-21 ENCOUNTER — OFFICE VISIT (OUTPATIENT)
Dept: PULMONOLOGY | Age: 63
End: 2023-09-21

## 2023-09-21 VITALS
SYSTOLIC BLOOD PRESSURE: 116 MMHG | DIASTOLIC BLOOD PRESSURE: 69 MMHG | HEIGHT: 71 IN | HEART RATE: 84 BPM | BODY MASS INDEX: 26.25 KG/M2 | WEIGHT: 187.5 LBS | OXYGEN SATURATION: 95 %

## 2023-09-21 DIAGNOSIS — J32.9 CHRONIC RHINOSINUSITIS: ICD-10-CM

## 2023-09-21 DIAGNOSIS — M19.90 ARTHRITIS: ICD-10-CM

## 2023-09-21 DIAGNOSIS — J45.51 SEVERE PERSISTENT ASTHMA WITH ACUTE EXACERBATION: Primary | ICD-10-CM

## 2023-09-21 DIAGNOSIS — R76.8 ELEVATED IGE LEVEL: ICD-10-CM

## 2023-09-21 DIAGNOSIS — Z91.09 ENVIRONMENTAL ALLERGIES: ICD-10-CM

## 2023-09-21 PROCEDURE — 99214 OFFICE O/P EST MOD 30 MIN: CPT | Performed by: INTERNAL MEDICINE

## 2023-09-21 PROCEDURE — 3074F SYST BP LT 130 MM HG: CPT | Performed by: INTERNAL MEDICINE

## 2023-09-21 PROCEDURE — 3078F DIAST BP <80 MM HG: CPT | Performed by: INTERNAL MEDICINE

## 2023-09-21 RX ORDER — SELEGILINE HYDROCHLORIDE 5 MG/1
CAPSULE ORAL
COMMUNITY
Start: 2023-07-21

## 2023-10-30 RX ORDER — FLUTICASONE PROPIONATE 100 UG/1
1 POWDER, METERED RESPIRATORY (INHALATION) 2 TIMES DAILY
Qty: 60 EACH | Refills: 3 | Status: SHIPPED | OUTPATIENT
Start: 2023-10-30

## 2024-02-13 DIAGNOSIS — J45.50 SEVERE PERSISTENT ASTHMA WITHOUT COMPLICATION: ICD-10-CM

## 2024-02-13 RX ORDER — MONTELUKAST SODIUM 10 MG/1
10 TABLET ORAL DAILY
Qty: 90 TABLET | Refills: 3 | Status: SHIPPED | OUTPATIENT
Start: 2024-02-13

## 2024-02-13 RX ORDER — FLUTICASONE FUROATE AND VILANTEROL 100; 25 UG/1; UG/1
1 POWDER RESPIRATORY (INHALATION)
Qty: 3 EACH | Refills: 1 | Status: SHIPPED | OUTPATIENT
Start: 2024-02-13

## 2024-02-16 ENCOUNTER — OFFICE VISIT (OUTPATIENT)
Dept: PULMONOLOGY | Age: 64
End: 2024-02-16

## 2024-02-16 VITALS
TEMPERATURE: 97.3 F | SYSTOLIC BLOOD PRESSURE: 122 MMHG | WEIGHT: 184.75 LBS | OXYGEN SATURATION: 96 % | HEIGHT: 71 IN | DIASTOLIC BLOOD PRESSURE: 72 MMHG | BODY MASS INDEX: 25.86 KG/M2 | HEART RATE: 79 BPM

## 2024-02-16 DIAGNOSIS — Z91.09 ENVIRONMENTAL ALLERGIES: ICD-10-CM

## 2024-02-16 DIAGNOSIS — J45.51 SEVERE PERSISTENT ASTHMA WITH ACUTE EXACERBATION: Primary | ICD-10-CM

## 2024-02-16 DIAGNOSIS — R76.8 ELEVATED IGE LEVEL: ICD-10-CM

## 2024-02-16 DIAGNOSIS — J32.9 CHRONIC RHINOSINUSITIS: ICD-10-CM

## 2024-02-16 DIAGNOSIS — M19.90 ARTHRITIS: ICD-10-CM

## 2024-02-16 RX ORDER — ALBUTEROL SULFATE 90 UG/1
2 AEROSOL, METERED RESPIRATORY (INHALATION) EVERY 4 HOURS PRN
COMMUNITY

## 2024-02-16 ASSESSMENT — PAIN SCALES - GENERAL: PAINLEVEL: 0

## 2024-02-26 ENCOUNTER — EXTERNAL LAB (OUTPATIENT)
Dept: HEALTH INFORMATION MANAGEMENT | Facility: OTHER | Age: 64
End: 2024-02-26

## 2024-02-26 LAB
25(OH)D3+25(OH)D2 SERPL-MCNC: 23.6 NG/ML
ALBUMIN SERPL-MCNC: 4.3 G/DL (ref 3.5–5.2)
ALBUMIN/GLOB SERPL: 1.7 {RATIO} (ref 1–2.5)
ALP SERPL-CCNC: 85 U/L (ref 40–130)
ALT SERPL-CCNC: 25 U/L (ref 0–41)
AMYLASE SERPL-CCNC: 65 U/L (ref 28–100)
APPEARANCE UR: CLEAR
AST SERPL-CCNC: 71 U/L (ref 0–37)
BASOPHILS # BLD: 0.1 10*3/UL (ref 0–0.1)
BASOPHILS NFR BLD: 0.9 % (ref 0.2–1.2)
BILIRUB SERPL-MCNC: 1.1 MG/DL (ref 0–1)
BILIRUB UR QL: NORMAL MG/DL
BUN SERPL-MCNC: 25 MG/DL (ref 6–20)
BUN/CREAT SERPL: 22.7 (ref 6–22)
CALCIUM SERPL-MCNC: 9.3 MG/DL (ref 8.4–10.2)
CHLORIDE SERPL-SCNC: 104 MMOL/L (ref 96–108)
CHOLEST SERPL-MCNC: 188 MG/DL (ref 140–200)
CHOLEST/HDLC SERPL: 3.5 {RATIO}
CO2 SERPL-SCNC: 25.5 MMOL/L (ref 21–31)
COLOR UR: NORMAL
CREAT SERPL-MCNC: 1.1 MG/DL (ref 0.7–1.2)
CRP SERPL HS-MCNC: 0.1 MG/DL (ref 0–0.5)
EOSINOPHIL # BLD: 0.6 10*3/UL (ref 0–0.5)
EOSINOPHIL NFR BLD: 8.3 % (ref 0.8–7)
ERYTHROCYTE [DISTWIDTH] IN BLOOD: 13.6 % (ref 11.6–14.4)
FERRITIN SERPL-MCNC: 191.8 NG/ML (ref 30–400)
FOLATE SERPL-MCNC: 10.86 NG/ML (ref 4.5–32.2)
GLOBULIN SER-MCNC: 2.6 G/DL (ref 1.9–3.7)
GLUCOSE SERPL-MCNC: 104 MG/DL (ref 74–106)
GLUCOSE UR-MCNC: NORMAL MG/DL
HBA1C MFR BLD: 5.1 % (ref 4.8–5.9)
HBV SURFACE AB SER QL: 3.5 IU/L (ref 0–7.99)
HBV SURFACE AG SER QL: NORMAL (ref 0–0.9)
HCT VFR BLD CALC: 42 % (ref 40.1–51)
HCV AB SER QL: NORMAL (ref 0–0.9)
HDLC SERPL-MCNC: 53 MG/DL (ref 35–80)
HGB BLD-MCNC: 13.6 G/DL (ref 13.5–18)
HGB UR QL: NORMAL
IGE SERPL-ACNC: 842.7 IU/ML (ref 0–158)
IRON BINDING, UNBOUND (OFFPRE1): 137 UG/DL (ref 110–370)
IRON SERPL-MCNC: 167 G/DL (ref 59–158)
KETONES UR-MCNC: NORMAL MG/DL
LAB RESULT: NORMAL
LDLC SERPL CALC-MCNC: 122.8 MG/DL
LENGTH OF FAST TIME PATIENT: ABNORMAL H
LENGTH OF FAST TIME PATIENT: NORMAL H
LEUKOCYTE ESTERASE UR QL STRIP: NORMAL
LYMPHOCYTES # BLD: 2.1 K/UL (ref 1.3–3.6)
LYMPHOCYTES NFR BLD: 31.9 % (ref 21.8–53.1)
MAGNESIUM SERPL-MCNC: 2.1 MG/DL (ref 1.7–2.6)
MCH RBC QN AUTO: 30.4 PG (ref 25.7–32.2)
MCHC RBC AUTO-ENTMCNC: 32.4 G/DL (ref 31–36)
MCV RBC AUTO: 94 FL (ref 79–97)
MONOCYTES # BLD: 0.5 10*3/UL (ref 0.3–0.8)
MONOCYTES NFR BLD: 6.8 % (ref 5.3–12.2)
NEUTROPHILS # BLD: 3.5 10*3/UL (ref 1.8–5.4)
NEUTROPHILS NFR BLD: 52.1 % (ref 34–67.9)
NITRITE UR QL: NORMAL
PH UR: 6 [PH] (ref 5–8)
PHOSPHATE SERPL-MCNC: 2.7 MG/DL (ref 2.5–4.6)
PLATELET # BLD: 269 K/UL (ref 140–440)
PMV BLD AUTO: 10.4 FL (ref 0–99.8)
POTASSIUM SERPL-SCNC: 4.3 MMOL/L (ref 3.3–5.1)
PROT SERPL-MCNC: 6.9 G/DL (ref 6.3–8.3)
PROT UR QL: 15 MG/DL
PSA SERPL-MCNC: 0.74 NG/ML (ref 0–4)
PTH-INTACT SERPL-MCNC: 50.7 PG/ML (ref 15–65)
RBC # BLD: 4.5 M/UL (ref 4.6–6.1)
RBC #/AREA URNS HPF: 10 /UL
SODIUM SERPL-SCNC: 137 MMOL/L (ref 133–145)
SP GR UR: 1.01 (ref 1–1.03)
T3 SERPL-MCNC: 98.95 NG/DL (ref 85–202)
T4 FREE SERPL-MCNC: 1.2 N/DL (ref 0.93–1.71)
TIBC SERPL-MCNC: 304 UG/DL (ref 245–400)
TRANSFERRIN SERPL-MCNC: 225 MG/DL (ref 200–360)
TRIGL SERPL-MCNC: 61 MG/DL (ref 0–200)
TSH SERPL-ACNC: 0.8 UIU/ML (ref 0.49–4.7)
UROBILINOGEN UR QL: NORMAL MG/DL (ref 0.2–1)
VIT B12 SERPL-MCNC: 283.5 PG/ML (ref 211–911)
WBC # BLD: 6.7 K/UL (ref 4–10.5)
WBC #/AREA URNS HPF: NORMAL /UL

## 2024-03-10 ENCOUNTER — TELEPHONE (OUTPATIENT)
Dept: PULMONOLOGY | Age: 64
End: 2024-03-10

## 2024-03-10 DIAGNOSIS — J45.51 SEVERE PERSISTENT ASTHMA WITH ACUTE EXACERBATION: Primary | ICD-10-CM

## 2024-03-10 RX ORDER — PREDNISONE 20 MG/1
40 TABLET ORAL DAILY
Qty: 10 TABLET | Refills: 0 | Status: SHIPPED | OUTPATIENT
Start: 2024-03-10 | End: 2024-03-15

## 2024-03-15 ENCOUNTER — OFFICE VISIT (OUTPATIENT)
Dept: OTOLARYNGOLOGY | Facility: CLINIC | Age: 64
End: 2024-03-15
Payer: COMMERCIAL

## 2024-03-15 VITALS — WEIGHT: 182 LBS | BODY MASS INDEX: 25.48 KG/M2 | HEIGHT: 71 IN

## 2024-03-15 DIAGNOSIS — H90.3 SENSORINEURAL HEARING LOSS, BILATERAL: ICD-10-CM

## 2024-03-15 DIAGNOSIS — M26.623 BILATERAL TEMPOROMANDIBULAR JOINT PAIN: ICD-10-CM

## 2024-03-15 DIAGNOSIS — H93.13 TINNITUS OF BOTH EARS: Primary | ICD-10-CM

## 2024-03-15 PROCEDURE — 99213 OFFICE O/P EST LOW 20 MIN: CPT | Performed by: SPECIALIST

## 2024-03-15 PROCEDURE — 3008F BODY MASS INDEX DOCD: CPT | Performed by: SPECIALIST

## 2024-03-16 NOTE — PATIENT INSTRUCTIONS
An audiogram was ordered for your bilateral tinnitus and sensorineural hearing loss.  Also given a handout for TMJ arthralgia.  No evidence infection either ear to account for the pain.  Can reduce sodium and caffeine for tinnitus.  Can also try Lipoflavonoid.

## 2024-03-16 NOTE — PROGRESS NOTES
Franklin Yeh is a 63 year old male.   Chief Complaint   Patient presents with    Ear Problem     Having issues with left ear     HPI:   Patient here with left otalgia.  Also has tinnitus and hearing loss.    Current Outpatient Medications   Medication Sig Dispense Refill    montelukast 10 MG Oral Tab Take 1 tablet (10 mg total) by mouth daily.      BREO ELLIPTA 100-25 MCG/INH Inhalation Aerosol Powder, Breath Activated INHALE 1 PUFF INTO THE LUNGS EVERY DAY FOR 30 DAYS      fluticasone propionate 50 MCG/ACT Nasal Suspension 2 sprays by Nasal route daily. 48 mL 1    albuterol 108 (90 Base) MCG/ACT Inhalation Aero Soln Inhale 2 puffs into the lungs every 4 (four) hours as needed.      cefdinir 300 MG Oral Cap Take 1 capsule (300 mg total) by mouth 2 (two) times daily. 28 capsule 0    docusate sodium 100 MG Oral Cap Take 100 mg by mouth daily.        Past Medical History:   Diagnosis Date    Asthma ([facility])       Social History:  Social History     Socioeconomic History    Marital status:    Tobacco Use    Smoking status: Never    Smokeless tobacco: Never   Substance and Sexual Activity    Alcohol use: Not Currently    Drug use: Never        REVIEW OF SYSTEMS:   GENERAL HEALTH: feels well otherwise  GENERAL : denies fever, chills, sweats, weight loss, weight gain  SKIN: denies any unusual skin lesions or rashes  RESPIRATORY: denies shortness of breath with exertion  NEURO: denies headaches    EXAM:   Ht 5' 11\" (1.803 m)   Wt 182 lb (82.6 kg)   BMI 25.38 kg/m²   System Details   Skin Inspection - Normal.   Constitutional Overall appearance - Normal.   Head/Face Facial features - Normal. Eyebrows - Normal. Skull - Normal.   Eyes Conjunctiva - Right: Normal, Left: Normal. Pupil - Right: Normal, Left: Normal.    Ears Inspection - Right: Normal, Left: Normal.   Canal - Right: Normal, Left: Normal.   TM - Right: Normal, Left: Normal.  No middle ear fluid either ear   Nasal External nose - Normal.   Nasal  septum - Normal.  Turbinates - Normal.   Oral/Oropharynx Lips - Normal, Tonsils - Normal, Tongue - Normal.  Pain and clicking of the bilateral temporomandibular joints by palpation of the condylar head.   Neck Exam Inspection - Normal. Palpation - Normal. Parotid gland - Normal. Thyroid gland - Normal.   Lymph Detail Submental. Submandibular. Anterior cervical. Posterior cervical. Supraclavicular all without enlargement   Psychiatric Orientation - Oriented to time, place, person & situation. Appropriate mood and affect.   Neurological Memory - Normal. Cranial nerves - Cranial nerves II through XII grossly intact.     ASSESSMENT AND PLAN:   1. Tinnitus of both ears  Will get audiogram to evaluate underlying hearing loss.  Audiogram done 9/29/2022 reviewed which showed a mild to moderate bilateral sensorineural hearing loss consistent with presbycusis.  - Audiology Referral - [facility])    2. Sensorineural hearing loss, bilateral  For tinnitus.  Patient can reduce sodium and caffeine.  Can also try Lipoflavonoid.  - Audiology Referral - [facility])    3. Bilateral temporomandibular joint pain  Handout given for TMJ arthralgia.      The patient indicates understanding of these issues and agrees to the plan.      Eda Flores MD  3/15/2024  8:00 PM

## 2024-03-18 ENCOUNTER — ORDER TRANSCRIPTION (OUTPATIENT)
Dept: ADMINISTRATIVE | Facility: HOSPITAL | Age: 64
End: 2024-03-18

## 2024-03-18 DIAGNOSIS — G56.01 CARPAL TUNNEL SYNDROME ON RIGHT: Primary | ICD-10-CM

## 2024-03-18 DIAGNOSIS — G56.02 CARPAL TUNNEL SYNDROME ON LEFT: ICD-10-CM

## 2024-03-21 ENCOUNTER — OFFICE VISIT (OUTPATIENT)
Dept: AUDIOLOGY | Facility: CLINIC | Age: 64
End: 2024-03-21
Payer: COMMERCIAL

## 2024-03-21 ENCOUNTER — PATIENT MESSAGE (OUTPATIENT)
Dept: OTOLARYNGOLOGY | Facility: CLINIC | Age: 64
End: 2024-03-21

## 2024-03-21 DIAGNOSIS — H90.3 SENSORINEURAL HEARING LOSS, BILATERAL: Primary | ICD-10-CM

## 2024-03-21 PROCEDURE — 92557 COMPREHENSIVE HEARING TEST: CPT | Performed by: AUDIOLOGIST

## 2024-03-21 PROCEDURE — 92567 TYMPANOMETRY: CPT | Performed by: AUDIOLOGIST

## 2024-03-21 NOTE — TELEPHONE ENCOUNTER
Hearing loss in both ears, slightly worse on the left. You would benefit from hearing aids in both ears. Repeat the hearing test in 1 years time, sooner if problems   Left message on the phone and message in my chart.

## 2024-03-22 ENCOUNTER — HOSPITAL ENCOUNTER (OUTPATIENT)
Dept: GENERAL RADIOLOGY | Facility: HOSPITAL | Age: 64
Discharge: HOME | End: 2024-03-22
Attending: UROLOGY
Payer: COMMERCIAL

## 2024-03-22 DIAGNOSIS — N20.0 KIDNEY STONES: ICD-10-CM

## 2024-03-22 PROCEDURE — 74018 RADEX ABDOMEN 1 VIEW: CPT | Performed by: UROLOGY

## 2024-04-09 ENCOUNTER — HOSPITAL ENCOUNTER (OUTPATIENT)
Dept: GENERAL RADIOLOGY | Facility: HOSPITAL | Age: 64
Discharge: HOME | End: 2024-04-09
Payer: MEDICARE

## 2024-04-09 DIAGNOSIS — M99.01 CERVICAL SEGMENT DYSFUNCTION: ICD-10-CM

## 2024-04-09 DIAGNOSIS — M25.312 INSTABILITY OF LEFT SHOULDER JOINT: ICD-10-CM

## 2024-04-09 PROCEDURE — 72040 X-RAY EXAM NECK SPINE 2-3 VW: CPT

## 2024-04-09 PROCEDURE — 73030 X-RAY EXAM OF SHOULDER: CPT

## 2024-05-21 ENCOUNTER — OFFICE VISIT (OUTPATIENT)
Dept: AUDIOLOGY | Facility: CLINIC | Age: 64
End: 2024-05-21

## 2024-05-21 DIAGNOSIS — H90.3 SENSORINEURAL HEARING LOSS, BILATERAL: Primary | ICD-10-CM

## 2024-05-21 PROCEDURE — 92591 HEARING AID EXAM, BOTH EARS: CPT | Performed by: AUDIOLOGIST

## 2024-05-21 NOTE — PROGRESS NOTES
HEARING AID EVALUATION    Franklin Yeh is a 64 year old male     Referring Provider: No ref. provider found   YOB: 1960  Medical Record: [id number]    Hearing Aid Prescription Date of Issue:   Hearing Aid Prescription Date of Expiration:           (One year from date of audiometric testing)     Patient History of Hearing Loss:   Patient works in construction field as , often around loud noise.   He is not working right now due to wrist surgery in near future, but does not anticipate that he will wear the hearing aids at work.   He notes hearing difficulties in many situations.   He needs TV volume louder than his wife.       Test Results:   See audiogram for air and bone conduction thresholds and recorded speech in quiet.    Quick SIN Speech in Noise Test presented binaurally through insert earphones yielded score of   12.5       SNR loss  The following table  shows scoring for this test and gives an indication of how well this person would be expected to hear in a background of noise.    0 - 3 dB SNR-Loss              Normal  4 - 7 dB SNR-Loss              Mild  8 - 15 dB SNR Loss           Moderate  >15 dB SNR Loss               Severe    Hearing Handicap Inventory Screening Version (HHIE-S)  Total Score=  (0-8 no handicap, 10-24 mild-mod handicap, 26-40 severe handicap)    Hearing Aid Selection:    Based on the audiometric test results, patient perception of hearing difficulty, and patient lifestyle the following hearing aid(s) and features are recommended as medically necessary to improve the patient's ability to hear speech sounds in a variety of listening environments.   The use of hearing aids will enhance the patient's quality of life and improve the ability to maintain social contacts with work, family or friends.      Type/Style of hearing aid recommended:   ROBIN ( in canal with custom micromold/c-shell)  Discussed the pros/cons of custom c-shell for his loss.     Patient does have narrow ear canals and will have to monitor for possible occlusion issues.    Decided to start with custom mold for better comfort and retention.   Could consider changing to domes if occlusion complaints or fit issues are present.         Recommended Features of hearing aid:    Rechargeable      Bluetooth Connectivity  Patient has iPHone at present and mostly uses speaker phone.    Noise reduction to improve speech perception in noisy environments  Dual Microphones to improve hearing speech from different directions  Active Feedback management  Frequency Lowering Capabilities to improve speech perception with severe high-frequency hearing loss  Echoblock/Windblock technology to reduce eugenio noise and reverberation in certain environments  Tinnitus Masking capabilities  Automatic steering between listening environments.         Hearing aid charges and policies were discussed with patient including warranty information, trial period with amplification, benefits/limitations of current technology.  Realistic expectations and acclimating to new sounds was also discussed.    Earmold impressions taken without difficulty.     Hearing aid benefits are reported to be $3500 per ear by the patient.   Benefit department is still working to verify this information.       Hearing Aid Order:   : Phonak  Model:Audeo L90-R  Color Choice:  P5 Champagne  Wire length right #2  Wire length left #2   Power right: M   Power left M  Technology Level Choice:  Option 1    Appointment for hearing aid fitting set for 6/6/2024 with follow up visit two weeks later.         Audiologist:  Melisa Escamilla  Audiology [place] License Number: [id number]

## 2024-06-01 ENCOUNTER — EKG ENCOUNTER (OUTPATIENT)
Dept: LAB | Facility: HOSPITAL | Age: 64
End: 2024-06-01
Attending: ORTHOPAEDIC SURGERY
Payer: MEDICARE

## 2024-06-01 DIAGNOSIS — G56.01 CARPAL TUNNEL SYNDROME, RIGHT: Primary | ICD-10-CM

## 2024-06-01 DIAGNOSIS — Z01.812 PRE-OPERATIVE LABORATORY EXAMINATION: ICD-10-CM

## 2024-06-01 DIAGNOSIS — M13.831: ICD-10-CM

## 2024-06-01 LAB
ALBUMIN SERPL-MCNC: 4.4 G/DL (ref 3.2–4.8)
ALBUMIN/GLOB SERPL: 1.5 {RATIO} (ref 1–2)
ALP LIVER SERPL-CCNC: 100 U/L
ALT SERPL-CCNC: 19 U/L
ANION GAP SERPL CALC-SCNC: 4 MMOL/L (ref 0–18)
AST SERPL-CCNC: 45 U/L (ref ?–34)
ATRIAL RATE: 62 BPM
BILIRUB SERPL-MCNC: 1.2 MG/DL (ref 0.2–1.1)
BUN BLD-MCNC: 19 MG/DL (ref 9–23)
BUN/CREAT SERPL: 19 (ref 10–20)
CALCIUM BLD-MCNC: 9.2 MG/DL (ref 8.7–10.4)
CHLORIDE SERPL-SCNC: 104 MMOL/L (ref 98–112)
CO2 SERPL-SCNC: 30 MMOL/L (ref 21–32)
CREAT BLD-MCNC: 1 MG/DL
EGFRCR SERPLBLD CKD-EPI 2021: 84 ML/MIN/1.73M2 (ref 60–?)
FASTING STATUS PATIENT QL REPORTED: YES
GLOBULIN PLAS-MCNC: 3 G/DL (ref 2–3.5)
GLUCOSE BLD-MCNC: 102 MG/DL (ref 70–99)
OSMOLALITY SERPL CALC.SUM OF ELEC: 288 MOSM/KG (ref 275–295)
P AXIS: 34 DEGREES
P-R INTERVAL: 194 MS
POTASSIUM SERPL-SCNC: 4.4 MMOL/L (ref 3.5–5.1)
PROT SERPL-MCNC: 7.4 G/DL (ref 5.7–8.2)
Q-T INTERVAL: 434 MS
QRS DURATION: 86 MS
QTC CALCULATION (BEZET): 440 MS
R AXIS: 7 DEGREES
SODIUM SERPL-SCNC: 138 MMOL/L (ref 136–145)
T AXIS: 22 DEGREES
VENTRICULAR RATE: 62 BPM

## 2024-06-01 PROCEDURE — 93010 ELECTROCARDIOGRAM REPORT: CPT | Performed by: STUDENT IN AN ORGANIZED HEALTH CARE EDUCATION/TRAINING PROGRAM

## 2024-06-01 PROCEDURE — 80053 COMPREHEN METABOLIC PANEL: CPT

## 2024-06-01 PROCEDURE — 36415 COLL VENOUS BLD VENIPUNCTURE: CPT

## 2024-06-01 PROCEDURE — 93005 ELECTROCARDIOGRAM TRACING: CPT

## 2024-06-03 ENCOUNTER — TELEPHONE (OUTPATIENT)
Dept: CARDIOLOGY | Age: 64
End: 2024-06-03

## 2024-06-06 ENCOUNTER — OFFICE VISIT (OUTPATIENT)
Dept: AUDIOLOGY | Facility: CLINIC | Age: 64
End: 2024-06-06
Payer: COMMERCIAL

## 2024-06-06 DIAGNOSIS — H90.3 SENSORINEURAL HEARING LOSS, BILATERAL: Primary | ICD-10-CM

## 2024-06-06 PROCEDURE — V5261 HEARING AID, DIGIT, BIN, BTE: HCPCS | Performed by: AUDIOLOGIST

## 2024-06-06 NOTE — PROGRESS NOTES
Hearing Aid Fitting    Franklin SAHARA Rao purchased two hearing aids.      Hearing Aid Information  Date of Dispensin2024  Make:    [employer]   Model:   Jr L90-R  Right Serial #[id number]  Left Serial #[id number]                Abigail Washington      Power: M right and left   Wire Length: #2 right and left  C-shell right: [id number]  C-shell left: [id number]   Battery: Rechargeable Li-Ion  Wax System:  CeruStop     Accessory: Phonak  Ease #[id number]    Completed programming and REM.   Good match of NAL-N2 targets.   Instructed patient on use and care.   Did not pair to phone as he is comfortable with BT.   Downloaded nina and reviewed briefly but also did not pair at this time.   Gave patient hotline number for BT support at [employer].    Ran REM at 100% in sofware, but decreased to 93% for patient comfort.  Will monitor use.  He can access VC as needed.     Initial fit of c-shells was fairly snug.   Used otoease to help c-shells slide into place.   Seemed to go in easier with a few insertions.       The following items were demonstrated to the patient:  Insertion/removal of hearing aid into ear  Insertion/removal of battery  Adjustment of volume  Changing programs  Function and operation of controls  Telephone use  Cleaning of hearing aid/earmold  Storage of hearing aids  Use of accessories  Remote control operation    The patient was informed of or received the following:  Adjustment period and realistic expectations  Battery size  Battery ingestion warning  Cleaning tools  Hearing aid instruction book   Carrying case  Purchase agreement  Repair/loss coverage  Trial period/return policy  Service period  Medical clearance    Patient expressed understanding to all discussed topics.  Follow-up scheduled for  in two weeks.      24  Cristopher Mckenna

## 2024-06-11 ENCOUNTER — LAB SERVICES (OUTPATIENT)
Dept: LAB | Age: 64
End: 2024-06-11

## 2024-06-11 ENCOUNTER — OFFICE VISIT (OUTPATIENT)
Dept: CARDIOLOGY | Age: 64
End: 2024-06-11

## 2024-06-11 VITALS
WEIGHT: 182.32 LBS | TEMPERATURE: 97.8 F | BODY MASS INDEX: 25.52 KG/M2 | DIASTOLIC BLOOD PRESSURE: 80 MMHG | OXYGEN SATURATION: 98 % | SYSTOLIC BLOOD PRESSURE: 130 MMHG | HEART RATE: 78 BPM | HEIGHT: 71 IN

## 2024-06-11 DIAGNOSIS — E78.00 PURE HYPERCHOLESTEROLEMIA: ICD-10-CM

## 2024-06-11 DIAGNOSIS — R93.89 ABNORMAL CT OF THE CHEST: ICD-10-CM

## 2024-06-11 DIAGNOSIS — I10 PRIMARY HYPERTENSION: ICD-10-CM

## 2024-06-11 DIAGNOSIS — R93.89 ABNORMAL CT OF THE CHEST: Primary | ICD-10-CM

## 2024-06-11 LAB
ATRIAL RATE (BPM): 78
P AXIS (DEGREES): 30
PR-INTERVAL (MSEC): 188
QRS-INTERVAL (MSEC): 82
QT-INTERVAL (MSEC): 398
QTC: 453
R AXIS (DEGREES): 0
REPORT TEXT: NORMAL
T AXIS (DEGREES): 23
VENTRICULAR RATE EKG/MIN (BPM): 78

## 2024-06-11 PROCEDURE — 36415 COLL VENOUS BLD VENIPUNCTURE: CPT | Performed by: INTERNAL MEDICINE

## 2024-06-11 PROCEDURE — 83695 ASSAY OF LIPOPROTEIN(A): CPT | Performed by: CLINICAL MEDICAL LABORATORY

## 2024-06-11 RX ORDER — MELOXICAM 15 MG/1
15 TABLET ORAL DAILY
COMMUNITY

## 2024-06-11 RX ORDER — ERGOCALCIFEROL 1.25 MG/1
1.25 CAPSULE ORAL
COMMUNITY
Start: 2024-06-10

## 2024-06-11 ASSESSMENT — ENCOUNTER SYMPTOMS
EYES NEGATIVE: 1
ORTHOPNEA: 0
ALLERGIC/IMMUNOLOGIC NEGATIVE: 1
ENDOCRINE NEGATIVE: 1
PSYCHIATRIC NEGATIVE: 1
RESPIRATORY NEGATIVE: 1
GASTROINTESTINAL NEGATIVE: 1
NEUROLOGICAL NEGATIVE: 1
NEAR-SYNCOPE: 0
HEMATOLOGIC/LYMPHATIC NEGATIVE: 1
CONSTITUTIONAL NEGATIVE: 1
SYNCOPE: 0

## 2024-06-11 ASSESSMENT — PAIN SCALES - GENERAL: PAINLEVEL: 0

## 2024-06-13 ENCOUNTER — TELEPHONE (OUTPATIENT)
Dept: CARDIOLOGY | Age: 64
End: 2024-06-13

## 2024-06-14 LAB — LPA SERPL-MCNC: 20 MG/DL

## 2024-06-20 ENCOUNTER — OFFICE VISIT (OUTPATIENT)
Dept: AUDIOLOGY | Facility: CLINIC | Age: 64
End: 2024-06-20

## 2024-06-20 DIAGNOSIS — H90.3 SENSORINEURAL HEARING LOSS, BILATERAL: Primary | ICD-10-CM

## 2024-06-20 PROCEDURE — 92593 HEARING AID CHECK, BOTH EARS: CPT | Performed by: AUDIOLOGIST

## 2024-06-20 NOTE — PROGRESS NOTES
HEARING AID FOLLOW-UP    Franklin Moralesbritta  5/15/1960  [id number]    Patient is here for first follow up after hearing aid fitting.    Hearing Aid Information    Date of Dispensin2024  Make:    Phonak   Model:   Celesteo L90-R  Right Serial #[id number]  Left Serial #[id number]                Color  Champagne      Power: M right and left   Wire Length: #2 right and left  C-shell right: [id number]  C-shell left: 1495P3YR   Battery: Rechargeable Li-Ion  Wax System:  CeruStop                Accessory: Phonak  Ease #[id number]    The patient has the following concerns:   Patient states that he only wore the hearing aids for a few days and found that right side c-shell was uncomfortable, so discontinued wearing both aids.       In office the following actions were taken:  Discussed that we could try to modify C-shell for a better fit and/or remake c-shell.   Patient wanted to try using domes instead.   Reprogrammed using large open domes.   Re-ran REM with relatively good results.   Slightly short of target gain on left side but limited by FB manager.    Patient still is reducing gain regularly for comfort.    Patient did pair aids to phone and added EnerG2 nina successfully.         Patient is to follow up about three weeks for 2nd follow up visit.  Appt set for .     2024  Cristopher Mckenna

## 2024-07-12 ENCOUNTER — TELEPHONE (OUTPATIENT)
Dept: PULMONOLOGY | Age: 64
End: 2024-07-12

## 2024-07-12 DIAGNOSIS — J45.50 SEVERE PERSISTENT ASTHMA WITHOUT COMPLICATION  (CMD): ICD-10-CM

## 2024-07-12 RX ORDER — FLUTICASONE FUROATE AND VILANTEROL 100; 25 UG/1; UG/1
1 POWDER RESPIRATORY (INHALATION)
Qty: 3 EACH | Refills: 1 | Status: SHIPPED | OUTPATIENT
Start: 2024-07-12

## 2024-07-16 ENCOUNTER — OFFICE VISIT (OUTPATIENT)
Dept: AUDIOLOGY | Facility: CLINIC | Age: 64
End: 2024-07-16
Payer: COMMERCIAL

## 2024-07-16 ENCOUNTER — TELEPHONE (OUTPATIENT)
Dept: PULMONOLOGY | Age: 64
End: 2024-07-16

## 2024-07-16 DIAGNOSIS — J45.51 SEVERE PERSISTENT ASTHMA WITH ACUTE EXACERBATION  (CMD): Primary | ICD-10-CM

## 2024-07-16 DIAGNOSIS — H90.3 SENSORINEURAL HEARING LOSS (SNHL) OF BOTH EARS: Primary | ICD-10-CM

## 2024-07-16 PROCEDURE — 92593 HEARING AID CHECK, BOTH EARS: CPT | Performed by: AUDIOLOGIST

## 2024-07-16 RX ORDER — PREDNISONE 20 MG/1
40 TABLET ORAL DAILY
Qty: 10 TABLET | Refills: 0 | Status: SHIPPED | OUTPATIENT
Start: 2024-07-16 | End: 2024-07-21

## 2024-07-16 NOTE — PROGRESS NOTES
HEARING AID FOLLOW-UP    Franklin Yeh  5/15/1960  [id number]    Patient is here for second follow up after new hearing aid fitting.    Hearing Aid Information    Date of Dispensin2024  Make:    Phonak   Model:   Celesteo L90-R  Right Serial #[id number]  Left Serial #[id number]                Color  Champagne      Power: M right and left   Wire Length: #2 right and left  C-shell right: [id number]  C-shell left: [id number]   Battery: Rechargeable Li-Ion  Wax System:  CeruStop                Accessory: Phonak  Ease #[id number]      The patient has the following concerns:   Patient states he has not been able to wear the hearing aids much due to recent hand surgery.   He anticipates 6-8 weeks of recovery/therapy and then will be having surgery on other hand.        Not wearing hearing aids at today's appt.    States he can get left aid in but not the right one.   His wife has tried to help but is not sure if she is placing them in ear correctly.       In office the following actions were taken:  Discussed options with patient, including returning aids for credit.     Patient is opting to return aids for credit.   He is aware of $300 non-refundable fee.       Received both aids and  back from patient in good condition.   Will submit refund request in amount of $5850.       Patient is to follow up as needed in future.   Annual hearing aid testing is recommended.     2024  Cristopher Mckenna

## 2024-07-30 ENCOUNTER — APPOINTMENT (OUTPATIENT)
Dept: PULMONOLOGY | Age: 64
End: 2024-07-30

## 2024-07-30 VITALS
RESPIRATION RATE: 18 BRPM | DIASTOLIC BLOOD PRESSURE: 71 MMHG | OXYGEN SATURATION: 96 % | BODY MASS INDEX: 27.1 KG/M2 | TEMPERATURE: 97.9 F | HEIGHT: 71 IN | WEIGHT: 193.56 LBS | HEART RATE: 95 BPM | SYSTOLIC BLOOD PRESSURE: 112 MMHG

## 2024-07-30 DIAGNOSIS — J45.30 MILD PERSISTENT ASTHMA WITHOUT COMPLICATION (CMD): Primary | ICD-10-CM

## 2024-07-30 ASSESSMENT — PATIENT HEALTH QUESTIONNAIRE - PHQ9
SUM OF ALL RESPONSES TO PHQ9 QUESTIONS 1 AND 2: 0
2. FEELING DOWN, DEPRESSED OR HOPELESS: NOT AT ALL
1. LITTLE INTEREST OR PLEASURE IN DOING THINGS: NOT AT ALL
CLINICAL INTERPRETATION OF PHQ2 SCORE: NO FURTHER SCREENING NEEDED
SUM OF ALL RESPONSES TO PHQ9 QUESTIONS 1 AND 2: 0

## 2024-07-30 ASSESSMENT — ENCOUNTER SYMPTOMS
CHEST TIGHTNESS: 0
SHORTNESS OF BREATH: 0
FEVER: 0
CHILLS: 0
WHEEZING: 0

## 2024-07-30 ASSESSMENT — PAIN SCALES - GENERAL: PAINLEVEL: 0

## 2024-08-14 ENCOUNTER — TELEPHONE (OUTPATIENT)
Dept: AUDIOLOGY | Facility: CLINIC | Age: 64
End: 2024-08-14

## 2024-08-14 NOTE — TELEPHONE ENCOUNTER
Patient calling to ask if the hearing aids Dr. Duncan ordered before is still in office. Patient states he is interested in having them again and would do another hearing aid check visit before 8/31 if possible. Patient states after August he will have medicare so he wants to be seen before that. Next available appointment for HAC is 9/30. Please call.

## 2024-08-14 NOTE — TELEPHONE ENCOUNTER
Spoke to patient - advised we would need to start over and the aids were returned to the . He would need to make an appointment with Dr. Duncan to start this process again. She does not have any availability until September 2024.  He was given appointment line phone number

## 2024-08-16 ENCOUNTER — TELEPHONE (OUTPATIENT)
Dept: AUDIOLOGY | Facility: CLINIC | Age: 64
End: 2024-08-16

## 2024-08-16 NOTE — TELEPHONE ENCOUNTER
Patient calling regarding hearing aide that was returned. Patient requesting call back at [phone],thanks.

## 2024-09-03 ENCOUNTER — NURSE ONLY (OUTPATIENT)
Dept: PULMONOLOGY | Age: 64
End: 2024-09-03

## 2024-09-03 DIAGNOSIS — Z23 NEED FOR PNEUMOCOCCAL 20-VALENT CONJUGATE VACCINATION: ICD-10-CM

## 2024-09-03 DIAGNOSIS — J45.30 MILD PERSISTENT ASTHMA WITHOUT COMPLICATION (CMD): Primary | ICD-10-CM

## 2024-09-03 DIAGNOSIS — Z23 NEED FOR PNEUMOCOCCAL VACCINATION: ICD-10-CM

## 2024-09-04 ENCOUNTER — APPOINTMENT (OUTPATIENT)
Dept: PULMONOLOGY | Age: 64
End: 2024-09-04

## 2024-09-18 ENCOUNTER — LAB ENCOUNTER (OUTPATIENT)
Dept: LAB | Facility: HOSPITAL | Age: 64
End: 2024-09-18
Attending: ORTHOPAEDIC SURGERY
Payer: COMMERCIAL

## 2024-09-18 DIAGNOSIS — M79.642 LEFT HAND PAIN: ICD-10-CM

## 2024-09-18 DIAGNOSIS — G56.02 CARPAL TUNNEL SYNDROME ON LEFT: ICD-10-CM

## 2024-09-18 DIAGNOSIS — G56.02 CARPAL TUNNEL SYNDROME ON LEFT: Primary | ICD-10-CM

## 2024-09-18 LAB
ALBUMIN SERPL-MCNC: 4.7 G/DL (ref 3.2–4.8)
ALBUMIN/GLOB SERPL: 1.6 {RATIO} (ref 1–2)
ALP LIVER SERPL-CCNC: 79 U/L
ALT SERPL-CCNC: 19 U/L
ANION GAP SERPL CALC-SCNC: 7 MMOL/L (ref 0–18)
AST SERPL-CCNC: 65 U/L (ref ?–34)
BILIRUB SERPL-MCNC: 1.7 MG/DL (ref 0.2–1.1)
BUN BLD-MCNC: 15 MG/DL (ref 9–23)
BUN/CREAT SERPL: 16.3 (ref 10–20)
CALCIUM BLD-MCNC: 9.8 MG/DL (ref 8.7–10.4)
CHLORIDE SERPL-SCNC: 106 MMOL/L (ref 98–112)
CO2 SERPL-SCNC: 28 MMOL/L (ref 21–32)
CREAT BLD-MCNC: 0.92 MG/DL
EGFRCR SERPLBLD CKD-EPI 2021: 93 ML/MIN/1.73M2 (ref 60–?)
FASTING STATUS PATIENT QL REPORTED: YES
GLOBULIN PLAS-MCNC: 3 G/DL (ref 2–3.5)
GLUCOSE BLD-MCNC: 103 MG/DL (ref 70–99)
OSMOLALITY SERPL CALC.SUM OF ELEC: 293 MOSM/KG (ref 275–295)
POTASSIUM SERPL-SCNC: 3.9 MMOL/L (ref 3.5–5.1)
PROT SERPL-MCNC: 7.7 G/DL (ref 5.7–8.2)
SODIUM SERPL-SCNC: 141 MMOL/L (ref 136–145)

## 2024-09-18 PROCEDURE — 80053 COMPREHEN METABOLIC PANEL: CPT

## 2024-09-18 PROCEDURE — 93005 ELECTROCARDIOGRAM TRACING: CPT

## 2024-09-18 PROCEDURE — 36415 COLL VENOUS BLD VENIPUNCTURE: CPT

## 2024-09-18 PROCEDURE — 93010 ELECTROCARDIOGRAM REPORT: CPT | Performed by: STUDENT IN AN ORGANIZED HEALTH CARE EDUCATION/TRAINING PROGRAM

## 2024-09-19 ENCOUNTER — TELEPHONE (OUTPATIENT)
Dept: CARDIOLOGY | Age: 64
End: 2024-09-19

## 2024-09-19 LAB
ATRIAL RATE: 66 BPM
P AXIS: 37 DEGREES
P-R INTERVAL: 200 MS
Q-T INTERVAL: 420 MS
QRS DURATION: 92 MS
QTC CALCULATION (BEZET): 440 MS
R AXIS: 5 DEGREES
T AXIS: 38 DEGREES
VENTRICULAR RATE: 66 BPM

## 2024-09-25 ENCOUNTER — APPOINTMENT (OUTPATIENT)
Dept: CT IMAGING | Age: 64
End: 2024-09-25
Attending: INTERNAL MEDICINE

## 2024-09-25 DIAGNOSIS — R93.89 ABNORMAL CT OF THE CHEST: ICD-10-CM

## 2024-09-25 PROCEDURE — 75571 CT HRT W/O DYE W/CA TEST: CPT

## 2024-10-16 ENCOUNTER — LAB ENCOUNTER (OUTPATIENT)
Dept: LAB | Age: 64
End: 2024-10-16
Attending: PHYSICIAN ASSISTANT
Payer: COMMERCIAL

## 2024-10-16 ENCOUNTER — TELEPHONE (OUTPATIENT)
Dept: FAMILY MEDICINE CLINIC | Facility: CLINIC | Age: 64
End: 2024-10-16

## 2024-10-16 ENCOUNTER — OFFICE VISIT (OUTPATIENT)
Dept: FAMILY MEDICINE CLINIC | Facility: CLINIC | Age: 64
End: 2024-10-16
Payer: COMMERCIAL

## 2024-10-16 VITALS
BODY MASS INDEX: 26.46 KG/M2 | RESPIRATION RATE: 18 BRPM | DIASTOLIC BLOOD PRESSURE: 77 MMHG | WEIGHT: 189 LBS | HEART RATE: 82 BPM | SYSTOLIC BLOOD PRESSURE: 118 MMHG | HEIGHT: 71 IN

## 2024-10-16 DIAGNOSIS — R73.09 ABNORMAL GLUCOSE: ICD-10-CM

## 2024-10-16 DIAGNOSIS — R25.1 TREMOR: ICD-10-CM

## 2024-10-16 DIAGNOSIS — Z12.5 SCREENING FOR MALIGNANT NEOPLASM OF PROSTATE: ICD-10-CM

## 2024-10-16 DIAGNOSIS — Z00.00 ENCOUNTER FOR ANNUAL HEALTH EXAMINATION: ICD-10-CM

## 2024-10-16 DIAGNOSIS — Z00.00 ROUTINE GENERAL MEDICAL EXAMINATION AT A HEALTH CARE FACILITY: ICD-10-CM

## 2024-10-16 DIAGNOSIS — Z00.00 ROUTINE GENERAL MEDICAL EXAMINATION AT A HEALTH CARE FACILITY: Primary | ICD-10-CM

## 2024-10-16 LAB
ALBUMIN SERPL-MCNC: 5 G/DL (ref 3.2–4.8)
ALBUMIN/GLOB SERPL: 1.7 {RATIO} (ref 1–2)
ALP LIVER SERPL-CCNC: 90 U/L
ALT SERPL-CCNC: 26 U/L
ANION GAP SERPL CALC-SCNC: 8 MMOL/L (ref 0–18)
AST SERPL-CCNC: 73 U/L (ref ?–34)
BASOPHILS # BLD AUTO: 0.06 X10(3) UL (ref 0–0.2)
BASOPHILS NFR BLD AUTO: 0.8 %
BILIRUB SERPL-MCNC: 0.9 MG/DL (ref 0.2–1.1)
BUN BLD-MCNC: 21 MG/DL (ref 9–23)
BUN/CREAT SERPL: 19.3 (ref 10–20)
CALCIUM BLD-MCNC: 9.8 MG/DL (ref 8.7–10.4)
CHLORIDE SERPL-SCNC: 104 MMOL/L (ref 98–112)
CHOLEST SERPL-MCNC: 212 MG/DL (ref ?–200)
CO2 SERPL-SCNC: 28 MMOL/L (ref 21–32)
COMPLEXED PSA SERPL-MCNC: 0.53 NG/ML (ref ?–4)
CREAT BLD-MCNC: 1.09 MG/DL
DEPRECATED RDW RBC AUTO: 44.1 FL (ref 35.1–46.3)
EGFRCR SERPLBLD CKD-EPI 2021: 76 ML/MIN/1.73M2 (ref 60–?)
EOSINOPHIL # BLD AUTO: 0.35 X10(3) UL (ref 0–0.7)
EOSINOPHIL NFR BLD AUTO: 4.8 %
ERYTHROCYTE [DISTWIDTH] IN BLOOD BY AUTOMATED COUNT: 12.8 % (ref 11–15)
EST. AVERAGE GLUCOSE BLD GHB EST-MCNC: 111 MG/DL (ref 68–126)
FASTING PATIENT LIPID ANSWER: NO
FASTING STATUS PATIENT QL REPORTED: NO
GLOBULIN PLAS-MCNC: 2.9 G/DL (ref 2–3.5)
GLUCOSE BLD-MCNC: 104 MG/DL (ref 70–99)
HBA1C MFR BLD: 5.5 % (ref ?–5.7)
HCT VFR BLD AUTO: 42.8 %
HDLC SERPL-MCNC: 56 MG/DL (ref 40–59)
HGB BLD-MCNC: 14.8 G/DL
IMM GRANULOCYTES # BLD AUTO: 0.02 X10(3) UL (ref 0–1)
IMM GRANULOCYTES NFR BLD: 0.3 %
LDLC SERPL CALC-MCNC: 145 MG/DL (ref ?–100)
LYMPHOCYTES # BLD AUTO: 1.83 X10(3) UL (ref 1–4)
LYMPHOCYTES NFR BLD AUTO: 24.9 %
MCH RBC QN AUTO: 32.5 PG (ref 26–34)
MCHC RBC AUTO-ENTMCNC: 34.6 G/DL (ref 31–37)
MCV RBC AUTO: 94.1 FL
MONOCYTES # BLD AUTO: 0.5 X10(3) UL (ref 0.1–1)
MONOCYTES NFR BLD AUTO: 6.8 %
NEUTROPHILS # BLD AUTO: 4.59 X10 (3) UL (ref 1.5–7.7)
NEUTROPHILS # BLD AUTO: 4.59 X10(3) UL (ref 1.5–7.7)
NEUTROPHILS NFR BLD AUTO: 62.4 %
NONHDLC SERPL-MCNC: 156 MG/DL (ref ?–130)
OSMOLALITY SERPL CALC.SUM OF ELEC: 293 MOSM/KG (ref 275–295)
PLATELET # BLD AUTO: 289 10(3)UL (ref 150–450)
POTASSIUM SERPL-SCNC: 4 MMOL/L (ref 3.5–5.1)
PROT SERPL-MCNC: 7.9 G/DL (ref 5.7–8.2)
RBC # BLD AUTO: 4.55 X10(6)UL
SODIUM SERPL-SCNC: 140 MMOL/L (ref 136–145)
TRIGL SERPL-MCNC: 62 MG/DL (ref 30–149)
TSI SER-ACNC: 1.55 MIU/ML (ref 0.55–4.78)
VLDLC SERPL CALC-MCNC: 12 MG/DL (ref 0–30)
WBC # BLD AUTO: 7.4 X10(3) UL (ref 4–11)

## 2024-10-16 PROCEDURE — 36415 COLL VENOUS BLD VENIPUNCTURE: CPT

## 2024-10-16 PROCEDURE — 83036 HEMOGLOBIN GLYCOSYLATED A1C: CPT

## 2024-10-16 PROCEDURE — 80053 COMPREHEN METABOLIC PANEL: CPT

## 2024-10-16 PROCEDURE — 80061 LIPID PANEL: CPT

## 2024-10-16 PROCEDURE — 84443 ASSAY THYROID STIM HORMONE: CPT

## 2024-10-16 PROCEDURE — G0438 PPPS, INITIAL VISIT: HCPCS | Performed by: PHYSICIAN ASSISTANT

## 2024-10-16 PROCEDURE — 85025 COMPLETE CBC W/AUTO DIFF WBC: CPT

## 2024-10-16 RX ORDER — SELEGILINE HYDROCHLORIDE 5 MG/1
5 CAPSULE ORAL
COMMUNITY
Start: 2024-10-16

## 2024-10-16 NOTE — PROGRESS NOTES
HPI:   Franklin Yeh is a 64 year old male who presents for an Annual Health Visit.   ***  10/30    Allergies:   Allergies[1]    CURRENT MEDICATIONS   Current Outpatient Medications   Medication Sig Dispense Refill    montelukast 10 MG Oral Tab Take 1 tablet (10 mg total) by mouth daily.      BREO ELLIPTA 100-25 MCG/INH Inhalation Aerosol Powder, Breath Activated INHALE 1 PUFF INTO THE LUNGS EVERY DAY FOR 30 DAYS      fluticasone propionate 50 MCG/ACT Nasal Suspension 2 sprays by Nasal route daily. 48 mL 1    albuterol 108 (90 Base) MCG/ACT Inhalation Aero Soln Inhale 2 puffs into the lungs every 4 (four) hours as needed.      cefdinir 300 MG Oral Cap Take 1 capsule (300 mg total) by mouth 2 (two) times daily. 28 capsule 0    docusate sodium 100 MG Oral Cap Take 100 mg by mouth daily.        HISTORICAL INFORMATION   Past Medical History:    Asthma (HCC)      Past Surgical History:   Procedure Laterality Date    Appendectomy      Colonoscopy N/A 8/17/2022    Procedure: COLONOSCOPY;  Surgeon: Augustin Myles MD;  Location: [facility] ENDOSCOPY      History reviewed. No pertinent family history.   SOCIAL HISTORY   Social History     Socioeconomic History    Marital status:    Tobacco Use    Smoking status: Never    Smokeless tobacco: Never   Substance and Sexual Activity    Alcohol use: Not Currently    Drug use: Never     Social History     Social History Narrative    Not on file        REVIEW OF SYSTEMS:     Review of Systems      EXAM:   /77 (BP Location: Left arm, Patient Position: Sitting, Cuff Size: adult)   Pulse 82   Resp 18   Ht 5' 11\" (1.803 m)   Wt 189 lb (85.7 kg)   BMI 26.36 kg/m²    Wt Readings from Last 6 Encounters:   10/16/24 189 lb (85.7 kg)   03/15/24 182 lb (82.6 kg)   09/28/22 180 lb (81.6 kg)   08/11/22 180 lb (81.6 kg)   05/25/22 180 lb (81.6 kg)   03/17/22 184 lb (83.5 kg)     Body mass index is 26.36 kg/m².    Physical Exam     ASSESSMENT AND PLAN:   There are no  diagnoses linked to this encounter.  ***  There are no Patient Instructions on file for this visit.    The patient indicates understanding of these issues and agrees to the plan.    Problem List:  Patient Active Problem List   Diagnosis    Sensorineural hearing loss, bilateral       Wilberto Acuña PA-C  10/16/2024  10:53 AM               [1] No Known Allergies

## 2024-10-16 NOTE — TELEPHONE ENCOUNTER
Per [facility] patient had labs/EKG and cardiac clearance already so nothing further needed

## 2024-10-16 NOTE — TELEPHONE ENCOUNTER
Patient seen by provider Dave Acuña today. Patient mentioned he has surgery coming up at the end of the month with [facility].     Will contact their office to request surgery information.

## 2024-10-16 NOTE — TELEPHONE ENCOUNTER
Left detailed message to call back. Will try again later.    Provider Dave, is requesting information regarding patient's surgery.   What procedure is patient having?   If any labs or EKG have to be ordered.     Our office fax number [phone].

## 2024-10-17 NOTE — PROGRESS NOTES
Subjective:   Franklin Yeh is a 64 year old male who presents for a Initial Annual Wellness Visit (outside the first 12 months of Medicare eligibility, no prior AWV) and scheduled follow up of multiple significant but stable problems.   The patient is doing fine at this time. The patient denies chest pain, SOB, N/V/C/D, fever, dizziness, syncope, and abdominal pain. There are no other concerns today.      History/Other:   Fall Risk Assessment:   He has been screened for Falls and is low risk.      Cognitive Assessment:   Abnormal  What day of the week is this?: Correct  What month is it?: Correct  What year is it?: Correct  Recall \"Ball\": Incorrect  Recall \"Flag\": Correct  Recall \"Tree\": Correct    Functional Ability/Status:   Franklin Yeh has a completely normal functional assessment. See flowsheet for details.      Depression Screening (PHQ):  PHQ-2 SCORE: 0  , done 10/16/2024   Last [place] Suicide Screening on 10/17/2024 was No Risk.      Advanced Directives:   He does NOT have a Living Will. [Do you have a living will?: No]  He does NOT have a Power of  for Health Care. [Do you have a healthcare power of ?: No]  Patient has Advance Care Planning documents but we do not have a copy in EMR. Discussed Advanced Care Planning with patient and instructed patient to get our office a copy to be scanned into EMR.      Patient Active Problem List   Diagnosis    Sensorineural hearing loss, bilateral    Tremor     Allergies:  He has No Known Allergies.    Current Medications:  Outpatient Medications Marked as Taking for the 10/16/24 encounter (Office Visit) with Wilberto Acuña PA-C   Medication Sig    Selegiline HCl 5 MG Oral Cap Take 1 capsule (5 mg total) by mouth 2 (two) times daily before meals.    montelukast 10 MG Oral Tab Take 1 tablet (10 mg total) by mouth daily.    BREO ELLIPTA 100-25 MCG/INH Inhalation Aerosol Powder, Breath Activated INHALE 1 PUFF INTO THE LUNGS EVERY  DAY FOR 30 DAYS       Medical History:  He  has a past medical history of Asthma (HCC).  Surgical History:  He  has a past surgical history that includes appendectomy and colonoscopy (N/A, 8/17/2022).   Family History:  His family history is not on file.  Social History:  He  reports that he has never smoked. He has never been exposed to tobacco smoke. He has never used smokeless tobacco. He reports that he does not currently use alcohol. He reports that he does not use drugs.    Tobacco:       CAGE Alcohol Screen:   CAGE screening score of 0 on 10/16/2024, showing low risk of alcohol abuse.      Patient Care Team:  Celestine Askew as PCP - General (Internal Medicine)    Review of Systems  GENERAL: feels well otherwise  SKIN: denies any unusual skin lesions  EYES: denies blurred vision or double vision  HEENT: denies nasal congestion, sinus pain or ST  LUNGS: denies shortness of breath with exertion  CARDIOVASCULAR: denies chest pain on exertion  GI: denies abdominal pain, denies heartburn  : 0 per night nocturia, no complaint of urinary incontinence  MUSCULOSKELETAL: denies back pain  NEURO: denies headaches  PSYCHE: denies depression or anxiety  HEMATOLOGIC: denies hx of anemia  ENDOCRINE: denies thyroid history  ALL/ASTHMA: denies hx of allergy or asthma    Objective:   Physical Exam  Physical Exam   Constitutional: Patient is oriented to person, place, and time. Patient appears well-developed and well-nourished.   HENT:   Head: Normocephalic and atraumatic.   Right Ear: External ear normal.   Left Ear: External ear normal.   Nose: Nose normal.   Mouth/Throat: Oropharynx is clear and moist.   Eyes: Pupils are equal, round, and reactive to light. Conjunctivae and EOM are normal.   Neck: Normal range of motion. Neck supple.   Cardiovascular: Normal rate, regular rhythm, normal heart sounds and intact distal pulses.   Pulmonary/Chest: Effort normal and breath sounds normal.   Abdominal: Soft. Bowel sounds are  normal.   Musculoskeletal: Normal range of motion.   Neurological: Patient is alert and oriented to person, place, and time.   Skin: no rashes, no lesions.   Psychiatric: patient has a normal mood and affect. Judgment and thought content normal.   Vitals reviewed.      /77 (BP Location: Left arm, Patient Position: Sitting, Cuff Size: adult)   Pulse 82   Resp 18   Ht 5' 11\" (1.803 m)   Wt 189 lb (85.7 kg)   BMI 26.36 kg/m²  Estimated body mass index is 26.36 kg/m² as calculated from the following:    Height as of this encounter: 5' 11\" (1.803 m).    Weight as of this encounter: 189 lb (85.7 kg).    Medicare Hearing Assessment:   Hearing Screening    Time taken: 10/11/2024 10:46 AM  Screening Method: Finger Rub  Finger Rub Result: Fail         Visual Acuity:   Right Eye Visual Acuity: Corrected Right Eye Chart Acuity: 20/20   Left Eye Visual Acuity: Corrected Left Eye Chart Acuity: 20/20   Both Eyes Visual Acuity: Corrected Both Eyes Chart Acuity: 20/15   Able To Tolerate Visual Acuity: Yes        Assessment & Plan:   Franklin Yeh is a 64 year old male who presents for a Medicare Assessment.     1. Routine general medical examination at a health care facility (Primary)  -     CBC With Differential With Platelet; Future; Expected date: 10/16/2024  -     Comp Metabolic Panel (14); Future; Expected date: 10/16/2024  -     Hemoglobin A1C; Future; Expected date: 10/16/2024  -     Lipid Panel; Future; Expected date: 10/16/2024  -     PSA Total, Screen; Future; Expected date: 10/16/2024  -     TSH W Reflex To Free T4; Future; Expected date: 10/16/2024    Overall health discussed, exercise/activity appropriate for age and health status, heathy diety, preventive care, and upcoming screening discussed. Routine labs ordered.    2. Screening for malignant neoplasm of prostate  -     PSA Total, Screen; Future; Expected date: 10/16/2024    3. Abnormal glucose  -     Hemoglobin A1C; Future; Expected date:  10/16/2024    4. Tremor  Continue with Selegiline 5 mg PO every day.    The patient indicates understanding of these issues and agrees to the plan.  Continue with current treatment plan.  Lab work ordered.  Patient reassured.  Reinforced healthy diet, lifestyle, and exercise.      No follow-ups on file.     Wilberto Acuña PA-C, 10/16/2024     Supplementary Documentation:   General Health:  In the past six months, have you lost more than 10 pounds without trying?: 2 - No  Has your appetite been poor?: No  Type of Diet: Balanced  How does the patient maintain a good energy level?: Appropriate Exercise;Daily Walks  How would you describe your daily physical activity?: Moderate  How would you describe your current health state?: Fair  How do you maintain positive mental well-being?: Social Interaction;Visiting Friends;Visiting Family  On a scale of 0 to 10, with 0 being no pain and 10 being severe pain, what is your pain level?: 0 - (None)  In the past six months, have you experienced urine leakage?: 0-No  At any time do you feel concerned for the safety/well-being of yourself and/or your children, in your home or elsewhere?: No  Have you had any immunizations at another office such as Influenza, Hepatitis B, Tetanus, or Pneumococcal?: No    Health Maintenance   Topic Date Due    Annual Physical  Never done    DTaP,Tdap,and Td Vaccines (1 - Tdap) Never done    COVID-19 Vaccine (1 - 2023-24 season) Never done    Zoster Vaccines (1 of 2) 11/16/2024 (Originally 5/15/2010)    Influenza Vaccine (1) 06/30/2025 (Originally 10/1/2024)    PSA  10/16/2026    Colorectal Cancer Screening  08/17/2027    Annual Depression Screening  Completed    Pneumococcal Vaccine: Birth to 64yrs  Aged Out

## 2024-11-19 DIAGNOSIS — E78.00 PURE HYPERCHOLESTEROLEMIA: Primary | ICD-10-CM

## 2024-11-19 RX ORDER — ASPIRIN 81 MG/1
81 TABLET ORAL DAILY
COMMUNITY
Start: 2024-11-19

## 2024-11-19 RX ORDER — ROSUVASTATIN CALCIUM 10 MG/1
10 TABLET, COATED ORAL DAILY
Qty: 30 TABLET | Refills: 11 | Status: SHIPPED | OUTPATIENT
Start: 2024-11-19 | End: 2024-11-20 | Stop reason: SDUPTHER

## 2024-11-20 ENCOUNTER — TELEPHONE (OUTPATIENT)
Dept: CARDIOLOGY | Age: 64
End: 2024-11-20

## 2024-11-20 RX ORDER — ROSUVASTATIN CALCIUM 10 MG/1
10 TABLET, COATED ORAL DAILY
Qty: 90 TABLET | Refills: 3 | Status: SHIPPED | OUTPATIENT
Start: 2024-11-20

## 2024-11-22 ENCOUNTER — ORDER TRANSCRIPTION (OUTPATIENT)
Dept: PHYSICAL THERAPY | Facility: HOSPITAL | Age: 64
End: 2024-11-22

## 2024-11-22 DIAGNOSIS — M18.12 PRIMARY OSTEOARTHRITIS OF FIRST CARPOMETACARPAL JOINT OF LEFT HAND: Primary | ICD-10-CM

## 2024-12-06 NOTE — TELEPHONE ENCOUNTER
Patient asking for refill of Rx:    montelukast 10 MG Oral Tab   Wants 3-mo supply    Send to verified pharmacy:    [id number] - [place], [place] - [address] [phone], [phone]   [address] [place] [address]   Phone: [phone] Fax: [phone]   Hours: Not open 24 hours

## 2024-12-08 ASSESSMENT — ENCOUNTER SYMPTOMS
RESPIRATORY NEGATIVE: 1
HEMATOLOGIC/LYMPHATIC NEGATIVE: 1
SYNCOPE: 0
EYES NEGATIVE: 1
ENDOCRINE NEGATIVE: 1
CONSTITUTIONAL NEGATIVE: 1
PSYCHIATRIC NEGATIVE: 1
ORTHOPNEA: 0
GASTROINTESTINAL NEGATIVE: 1
ALLERGIC/IMMUNOLOGIC NEGATIVE: 1
NEAR-SYNCOPE: 0
NEUROLOGICAL NEGATIVE: 1

## 2024-12-09 ENCOUNTER — TELEPHONE (OUTPATIENT)
Dept: FAMILY MEDICINE CLINIC | Facility: CLINIC | Age: 64
End: 2024-12-09

## 2024-12-09 DIAGNOSIS — I77.1 ARTERY STENOSIS: Primary | ICD-10-CM

## 2024-12-09 NOTE — TELEPHONE ENCOUNTER
Patient is requesting referral.     Name of specialist and specialty department: Mick Smith (Interventional Cardiology)     Reason for visit with the specialist: Consultation    Address of the specialist office: [facility] [place] [address]    Appointment date: 12/10/2024 @ 10am     Fax#: [phone]    CSS informed patient the turnaround time for referral is 5-7 business days.  Patient was informed to check their [employer] account for referral status.

## 2024-12-10 ENCOUNTER — APPOINTMENT (OUTPATIENT)
Dept: CARDIOLOGY | Age: 64
End: 2024-12-10

## 2024-12-10 VITALS
HEART RATE: 74 BPM | SYSTOLIC BLOOD PRESSURE: 144 MMHG | HEIGHT: 71 IN | DIASTOLIC BLOOD PRESSURE: 79 MMHG | BODY MASS INDEX: 27.04 KG/M2 | OXYGEN SATURATION: 98 % | TEMPERATURE: 97.5 F | WEIGHT: 193.12 LBS

## 2024-12-10 DIAGNOSIS — I10 PRIMARY HYPERTENSION: Primary | ICD-10-CM

## 2024-12-10 DIAGNOSIS — E78.00 PURE HYPERCHOLESTEROLEMIA: ICD-10-CM

## 2024-12-10 PROCEDURE — 99214 OFFICE O/P EST MOD 30 MIN: CPT | Performed by: INTERNAL MEDICINE

## 2024-12-10 PROCEDURE — 3077F SYST BP >= 140 MM HG: CPT | Performed by: INTERNAL MEDICINE

## 2024-12-10 PROCEDURE — 3078F DIAST BP <80 MM HG: CPT | Performed by: INTERNAL MEDICINE

## 2024-12-10 NOTE — TELEPHONE ENCOUNTER
Katelyn Acuña,     Patient called requesting referral to Dr. Alexei Barton for a consultation     Patient provided DX.     Pended referral please review diagnosis and sign off if you agree.    Thank you.  Arline Charlton  [facility]

## 2024-12-11 RX ORDER — MONTELUKAST SODIUM 10 MG/1
10 TABLET ORAL DAILY
Qty: 90 TABLET | Refills: 3 | Status: SHIPPED | OUTPATIENT
Start: 2024-12-11

## 2024-12-11 NOTE — TELEPHONE ENCOUNTER
Please Review. Protocol Failed; No Protocol   Please advise medication is patient external reported or historical.     Medication previously written by Henri Bennett     Patient requesting refill, is refill appropriate Medication refill pended for your review/approval       Requested Prescriptions   Pending Prescriptions Disp Refills    montelukast 10 MG Oral Tab 90 tablet 3     Sig: Take 1 tablet (10 mg total) by mouth daily.       Asthma & COPD Medication Protocol Failed - 12/11/2024  3:23 PM        Failed - ACT Score greater than or equal to 20                Failed - ACT recorded in the last 12 months                Passed - Appointment in past 6 or next 3 months      Recent Outpatient Visits              1 month ago Routine general medical examination at a health care facility    [facility], [place], Minhxuyen, PA-C    Office Visit    4 months ago Sensorineural hearing loss (SNHL) of both ears    [facility], Melisa Montoya Au.TUAN    Office Visit    5 months ago Sensorineural hearing loss, bilateral    [facility], [place] Au.D    Office Visit    6 months ago Sensorineural hearing loss, bilateral    [facility], [place] Au.[place]    Office Visit    6 months ago Sensorineural hearing loss, bilateral    [facility], [place] Au.[place]    Office Visit          Future Appointments         Provider Department Appt Notes    In 2 weeks Eda Figueredo [facility] Rehab Occupational Therapy auth after eval--SEND to insur; [employer]  *Pt. bringing order to 1st appt.    In 3 weeks Eda Figueredo [facility] Rehab Occupational Therapy auth???; [employer]    In 3 weeks Eda Figueredo OT [facility] Rehab Occupational Therapy auth???; [employer]    In 4 weeks Bea  Eda, [facility] Rehab Occupational Therapy auth???; [employer]    [facility] Re[facility] Occup[facility] Therapy auth???; [employer]    In 1 month [facility], Eda, [facility] Rehab Occupational Therapy auth???; [employer]    [facility] Re[facility] Occupational Therapy auth???; [employer]    In 1 month [facility], Eda, OT [facility] Rehab Occupational Therapy auth???; [employer]    [facility], [facility] Re[facility] Occupational Therapy auth???; [employer]    In 1 month [facility], Eda, [facility] Rehab Occupational Therapy auth???; [employer]    In [facility], [facility] Re[facility] Occupational Therapy auth???; [employer]    In 1 month [facility], Eda, [facility] Rehab Occupational Therapy auth???; [employer]                           Future Appointments         Provider Department Appt Notes    In 2 weeks [facility], [place], [facility] Rehab Occupational Therapy auth after eval--SEND to insur; [employer]  *Pt. bringing order to 1st appt.    In 3 weeks [facility], Eda, [facility] Rehab Occupational Therapy auth???; [employer]    In 3 weeks [facility], Eda, [facility] Rehab Occupational Therapy auth???; [employer]    In 4 weeks [facility], Eda, [facility] Rehab Occupational Therapy auth???; [employer]    In [facility] month [facility] Rehab Occupational Therapy auth???; [employer]    In 1 month [facility], Eda, [facility] Rehab Occupational Therapy auth???; [employer]    In [facility]da, OT [facility] Rehab Occupational Therapy auth???; [employer]    In 1 month [facility], Eda, [facility] Rehab Occupational Therapy auth???; [employer]    In 1 month Eda Figueredo, OT [facility] Rehab Occupational Therapy auth???; Radha O    In 1 month Eda Figueredo, OT [facility] Rehab  Occupational Therapy auth???; [employer]    [facility] month [facility] Re[facility] Occupational Therapy auth???; [employer]    In 1 month [facility] Rehab Occupational Therapy auth???; [employer]          Recent Outpatient Visits              1 month ago Routine general medical examination at a health care facility    [facility], [facility], [place], Minhxuyen, PA-C    Office Visit    4 months ago Sensorineural hearing loss (SNHL) of both ears    [facility], Melisa Montoya Au.D    Office Visit    5 months ago Sensorineural hearing loss, bilateral    [facility], [place] Au.[place]    Office Visit    6 months ago Sensorineural hearing loss, bilateral    [facility], Melisa Montoya Au.D    Office Visit    6 months ago Sensorineural hearing loss, bilateral    [facility], [place] Au.[place]    Office Visit

## 2024-12-17 ENCOUNTER — TELEPHONE (OUTPATIENT)
Dept: PULMONOLOGY | Age: 64
End: 2024-12-17

## 2024-12-17 DIAGNOSIS — J45.51 SEVERE PERSISTENT ASTHMA WITH ACUTE EXACERBATION  (CMD): Primary | ICD-10-CM

## 2024-12-17 RX ORDER — PREDNISONE 20 MG/1
40 TABLET ORAL DAILY
Qty: 10 TABLET | Refills: 0 | Status: SHIPPED | OUTPATIENT
Start: 2024-12-17 | End: 2024-12-22

## 2024-12-26 ENCOUNTER — TELEPHONE (OUTPATIENT)
Dept: CARDIOLOGY | Age: 64
End: 2024-12-26

## 2024-12-26 DIAGNOSIS — E78.00 PURE HYPERCHOLESTEROLEMIA: Primary | ICD-10-CM

## 2024-12-26 DIAGNOSIS — R93.1 ELEVATED CORONARY ARTERY CALCIUM SCORE: ICD-10-CM

## 2024-12-26 DIAGNOSIS — I10 PRIMARY HYPERTENSION: ICD-10-CM

## 2024-12-26 DIAGNOSIS — E78.00 ELEVATED LDL CHOLESTEROL LEVEL: ICD-10-CM

## 2024-12-26 NOTE — TELEPHONE ENCOUNTER
Patient called requesting a refill for the following medication:     Selegiline HCl 5 MG Oral Cap     Pharmacy: [place] [address] [place] [address] [phone], [phone]

## 2024-12-27 ENCOUNTER — LAB ENCOUNTER (OUTPATIENT)
Dept: LAB | Facility: HOSPITAL | Age: 64
End: 2024-12-27
Attending: ORTHOPAEDIC SURGERY
Payer: COMMERCIAL

## 2024-12-27 DIAGNOSIS — M79.641 RIGHT HAND PAIN: Primary | ICD-10-CM

## 2024-12-27 LAB
ALBUMIN SERPL-MCNC: 4.3 G/DL (ref 3.2–4.8)
ALBUMIN/GLOB SERPL: 1.6 {RATIO} (ref 1–2)
ALP LIVER SERPL-CCNC: 89 U/L
ALT SERPL-CCNC: 30 U/L
ANION GAP SERPL CALC-SCNC: 8 MMOL/L (ref 0–18)
AST SERPL-CCNC: 47 U/L (ref ?–34)
BASOPHILS # BLD AUTO: 0.05 X10(3) UL (ref 0–0.2)
BASOPHILS NFR BLD AUTO: 0.5 %
BILIRUB SERPL-MCNC: 0.9 MG/DL (ref 0.2–1.1)
BUN BLD-MCNC: 18 MG/DL (ref 9–23)
BUN/CREAT SERPL: 18.2 (ref 10–20)
CALCIUM BLD-MCNC: 9.4 MG/DL (ref 8.7–10.4)
CHLORIDE SERPL-SCNC: 104 MMOL/L (ref 98–112)
CO2 SERPL-SCNC: 29 MMOL/L (ref 21–32)
CREAT BLD-MCNC: 0.99 MG/DL
DEPRECATED RDW RBC AUTO: 43.4 FL (ref 35.1–46.3)
EGFRCR SERPLBLD CKD-EPI 2021: 85 ML/MIN/1.73M2 (ref 60–?)
EOSINOPHIL # BLD AUTO: 0.72 X10(3) UL (ref 0–0.7)
EOSINOPHIL NFR BLD AUTO: 6.9 %
ERYTHROCYTE [DISTWIDTH] IN BLOOD BY AUTOMATED COUNT: 12.5 % (ref 11–15)
FASTING STATUS PATIENT QL REPORTED: YES
GLOBULIN PLAS-MCNC: 2.7 G/DL (ref 2–3.5)
GLUCOSE BLD-MCNC: 104 MG/DL (ref 70–99)
HCT VFR BLD AUTO: 42.3 %
HGB BLD-MCNC: 14.6 G/DL
IMM GRANULOCYTES # BLD AUTO: 0.03 X10(3) UL (ref 0–1)
IMM GRANULOCYTES NFR BLD: 0.3 %
LYMPHOCYTES # BLD AUTO: 2.26 X10(3) UL (ref 1–4)
LYMPHOCYTES NFR BLD AUTO: 21.6 %
MCH RBC QN AUTO: 32.7 PG (ref 26–34)
MCHC RBC AUTO-ENTMCNC: 34.5 G/DL (ref 31–37)
MCV RBC AUTO: 94.8 FL
MONOCYTES # BLD AUTO: 0.99 X10(3) UL (ref 0.1–1)
MONOCYTES NFR BLD AUTO: 9.5 %
NEUTROPHILS # BLD AUTO: 6.39 X10 (3) UL (ref 1.5–7.7)
NEUTROPHILS # BLD AUTO: 6.39 X10(3) UL (ref 1.5–7.7)
NEUTROPHILS NFR BLD AUTO: 61.2 %
OSMOLALITY SERPL CALC.SUM OF ELEC: 294 MOSM/KG (ref 275–295)
PLATELET # BLD AUTO: 232 10(3)UL (ref 150–450)
POTASSIUM SERPL-SCNC: 4.1 MMOL/L (ref 3.5–5.1)
PROT SERPL-MCNC: 7 G/DL (ref 5.7–8.2)
RBC # BLD AUTO: 4.46 X10(6)UL
SODIUM SERPL-SCNC: 141 MMOL/L (ref 136–145)
WBC # BLD AUTO: 10.4 X10(3) UL (ref 4–11)

## 2024-12-27 PROCEDURE — 80053 COMPREHEN METABOLIC PANEL: CPT

## 2024-12-27 PROCEDURE — 93010 ELECTROCARDIOGRAM REPORT: CPT | Performed by: INTERNAL MEDICINE

## 2024-12-27 PROCEDURE — 93005 ELECTROCARDIOGRAM TRACING: CPT

## 2024-12-27 PROCEDURE — 36415 COLL VENOUS BLD VENIPUNCTURE: CPT

## 2024-12-27 PROCEDURE — 85025 COMPLETE CBC W/AUTO DIFF WBC: CPT

## 2024-12-28 LAB
ATRIAL RATE: 79 BPM
P AXIS: 43 DEGREES
P-R INTERVAL: 186 MS
Q-T INTERVAL: 348 MS
QRS DURATION: 84 MS
QTC CALCULATION (BEZET): 399 MS
R AXIS: 4 DEGREES
T AXIS: 29 DEGREES
VENTRICULAR RATE: 79 BPM

## 2024-12-30 RX ORDER — SELEGILINE HYDROCHLORIDE 5 MG/1
5 CAPSULE ORAL
Qty: 60 CAPSULE | Refills: 3 | Status: SHIPPED | OUTPATIENT
Start: 2024-12-30

## 2024-12-30 NOTE — TELEPHONE ENCOUNTER
Please review. Protocol Failed; No Protocol    Requested Prescriptions   Pending Prescriptions Disp Refills    Selegiline HCl 5 MG Oral Cap  0     Sig: Take 1 capsule (5 mg total) by mouth 2 (two) times daily before meals.       Neurology Medications Failed - 12/30/2024 11:05 AM        Failed - In person appointment or virtual visit in the past 6 mos or appointment in next 3 mos     Recent Outpatient Visits              2 months ago Routine general medical examination at a health care facility    [facility], [place], Minhxuyen, PA-C    Office Visit    5 months ago Sensorineural hearing loss (SNHL) of both ears    [facility], Melisa Montoya Au.D    Office Visit    6 months ago Sensorineural hearing loss, bilateral    [facility], Melisa Montoya Au.D    Office Visit    6 months ago Sensorineural hearing loss, bilateral    [facility], [place] Au.[place]    Office Visit    7 months ago Sensorineural hearing loss, bilateral    [facility], Melisa Montoya Au.D    Office Visit          Future Appointments         Provider Department Appt Notes    In 3 days Eda Figueredo, OT [facility] Rehab Occupational Therapy auth after eval--SEND to insur; [employer]  *Pt. bringing order to 1st appt.    In 1 week Eda Figueredo, OT [facility] Rehab Occupational Therapy auth???; [employer]    In 1 week Eda Figueredo, OT [facility] Rehab Occupational Therapy auth???; [employer]    In 2 weeks Eda Figueredo, OT [facility] Rehab Occupational Therapy auth???; [employer]    In 2 weeks WiEda fish, OT [facility] Rehab Occupational Therapy auth???; [employer]    In 3 weeks Eda Figueredo, OT [facility] Rehab Occupational Therapy auth???; [employer]    In 3 weeks Eda Figueredo, OT  [facility] Rehab Occupational Therapy auth???; [employer]    [facility] Rehab Occupational Therapy auth???; [employer]    In 1 month [facility]da, [facility] Rehab Occupational Therapy auth???; [employer]    In 1 month [facility]da, [facility] Rehab Occupational Therapy auth???; [employer]    In 1 [facility]da, [facility] Rehab Occupational Therapy auth???; [employer]    In 1 month [facility]da, OT [facility] Rehab Occupational Therapy auth???; [employer]                           Future Appointments         Provider Department Appt Notes    In 3 days [facility], [place], [facility] Rehab Occupational Therapy auth after eval--SEND to insur; [employer]  *Pt. bringing order to 1st appt.    In 1 week [facility], Eda, [facility] Rehab Occupational Therapy auth???; [employer]    [facility] week [facility] Re[facility] Occupational Therapy auth???; [employer]    In 2 weeks [facility]da, OT [facility] Rehab Occupational Therapy auth???; [employer]    In 2 weeks [facility], [facility] Rehab Occupational Therapy auth???; [employer]    In 3 weeks [facility]da, OT [facility] Rehab Occupational Therapy auth???; [employer]    In 3 weeks [facility]da, OT [facility] Rehab Occupational Therapy auth???; [employer]    In 4 [facility], [facility] Rehab Occupational Therapy auth???; [employer]    In 1 month [facility]da, [facility] Rehab Occupational Therapy auth???; [employer]    In 1 month [facility]da, [facility] Rehab Occupational Therapy auth???; [employer]    In 1 month [facility], [facility] Rehab Occupational Therapy auth???; [employer]    In 1 month [facility]da, OT [facility] Rehab Occupational Therapy auth???; [employer]          Recent Outpatient Visits              2 months ago Routine general  medical examination at a health care facility    [facility], [place], Minh[place]en, PA-C    Office Visit    5 months ago Sensorineural hearing loss (SNHL) of both ears    [facility], Melisa Montoya Au.D    Office Visit    6 months ago Sensorineural hearing loss, bilateral    [facility], Melisa Montoya Au.D    Office Visit    6 months ago Sensorineural hearing loss, bilateral    [facility], Melisa Montoya Au.D    Office Visit    7 months ago Sensorineural hearing loss, bilateral    [facility], Melisa Montoya Au.D    Office Visit

## 2024-12-31 ENCOUNTER — APPOINTMENT (OUTPATIENT)
Dept: CARDIOLOGY | Age: 64
End: 2024-12-31
Attending: INTERNAL MEDICINE

## 2024-12-31 ENCOUNTER — APPOINTMENT (OUTPATIENT)
Dept: OCCUPATIONAL MEDICINE | Facility: HOSPITAL | Age: 64
End: 2024-12-31
Attending: ORTHOPAEDIC SURGERY
Payer: MEDICARE

## 2025-01-02 ENCOUNTER — TELEPHONE (OUTPATIENT)
Dept: PHYSICAL THERAPY | Facility: HOSPITAL | Age: 65
End: 2025-01-02

## 2025-01-02 ENCOUNTER — OFFICE VISIT (OUTPATIENT)
Dept: OCCUPATIONAL MEDICINE | Facility: HOSPITAL | Age: 65
End: 2025-01-02
Attending: ORTHOPAEDIC SURGERY
Payer: MEDICARE

## 2025-01-02 DIAGNOSIS — M18.12 PRIMARY OSTEOARTHRITIS OF FIRST CARPOMETACARPAL JOINT OF LEFT HAND: Primary | ICD-10-CM

## 2025-01-02 PROCEDURE — 97110 THERAPEUTIC EXERCISES: CPT

## 2025-01-02 PROCEDURE — 97165 OT EVAL LOW COMPLEX 30 MIN: CPT

## 2025-01-02 NOTE — PROGRESS NOTES
OCCUPATIONAL THERAPY UPPER EXTREMITY EVALUATION     Diagnosis:   Primary osteoarthritis of first carpometacarpal joint of left hand (M18.12)       Referring Provider: Agustín Colón  Date of Evaluation:    1/2/2025    Precautions:   Following [place] Protocols for CTR and CMC arthroplasty Next MD visit: 01/08/25  Date of Surgery: 10/31/24     Doctor's Instructions: At 6 weeks post-op, patient may begin therapy for all fingers, wrist, and thumb AAROM and AROM with OT. Pinch retraining at 6 weeks- (emphasize CMC abduction, radial extension, and opposition to each finger) Continue orthosis after exercise and at night.    PATIENT SUMMARY   Franklin Yeh is a 64 year old male who presents to therapy today s/p surgical intervention to the Left hand.   Pt was having arthrtic pain prior to surgery. D/t pain, pt and doctor decided on L CMC arthroplasty and carpal tunnel release. Prior to this surgery, pt had the same surgery performed on the R side. Pt is 9 weeks post-op. Pt is just now starting occupational therapy here because of recent vacationing in [place]. Pt did undergo an evaluation in [place] in which he was provided with AROM exercises.  Pt describes pain level current 3/10, at best 2/10, at worst 5/10.   Current functional limitations include inability to lift heavy, fastening buttons, and pinching items.    Franklin describes prior level of function independent adl and iadl.   Employment: Retired  Hand Dominance: right  Living Situation: w/ spouse  Imaging/Tests: none available of the left hand  Pt goals include return to full ability including lifting and gripping.  Past medical history was reviewed with Franklin. Significant findings include   Past Medical History:    Asthma ([facility])         ASSESSMENT  Franklin presents to occupational therapy evaluation s/p Left CTR and CMC arthroplasty on 10/31/24. The results of the objective tests and measures show deficits in L wrist AROM, thumb AROM, fine motor  control, and L hand strength.  Functional deficits include but are not limited to inability to lift heavy, fastening buttons, and pinching items.  Signs and symptoms are consistent with diagnosis of orthopedic aftercare s/p CTR and CMC arthroplasty of the left hand s/p Primary osteoarthritis of first carpometacarpal joint of left hand. Pt and OT discussed evaluation findings, pathology, POC and HEP.  Pt voiced understanding and performs HEP correctly without reported pain. Skilled Occupational Therapy is medically necessary to address the above impairments and reach functional goals.     OBJECTIVE    OBSERVATION: Visible incision site over the dorsum of the Left thumb and mild edema present over CT    ORTHOTICS: prefabricated thumb spica    SCAR: Adhered Min     SENSORY: WNL    ROM: (* denotes performed with pain)  Shoulder  Elbow Wrist   WNL WNL Flexion: R 45, L 35  Extension: R 50, L 60  Ulnar Deviation: R 35, L 30  Radial Deviation R 15, L 10     AROM/PROM:(Degrees)  RIGHT HAND:    Thumb   MP 0/50       IP 0/74   POTTS 124   Thumb radial abduction: 55 degrees  Thumb Palmar abduction: 50 degrees    LEFT HAND:    Thumb   MP 0/50       IP 0/53   POTTS 103   Thumb radial abduction: 45 degrees  Thumb Palmar abduction: 35 degrees      Strength (lbs) Right Average Left Average   : 52 45   2 pt Pinch: 16 10   3 pt Pinch: 20 14   Lateral Pinch: 18 17     Coordination:   9 Hole Peg Test:           Right Hand: 29 seconds          Left Hand: 27 seconds     Today’s Treatment and Response:   Pt education was provided on exam findings, treatment diagnosis, treatment plan, expectations, and prognosis. Pt was also provided recommendations for continuation of orthosis as prescribed by doctor   Patient was instructed in and issued a HEP for: Red putty exercises-lateral pinch, thumb extension, thumb palmar abduction, and opposition     Charges: OT Eval: Low Complexity, TE 2      Total Timed Treatment: 25 min     Total Treatment  Time: 45 min     Based on clinical rationale and outcome measures, this evaluation involved Low Complexity decision making due to 1-2 personal factors/comorbidities, 3 body structures involved/activity limitations, and evolving symptoms including  decline in functional independence .  PLAN OF CARE   Goals: (to be met in 8 visits)  Pt will be independent and compliant with comprehensive HEP to maintain progress achieved in OT  Pt will increase their (L) thumb radial abduction to at least 55 degrees for ease of typing.  Pt will increase their (L) thumb palmar abduction to at least 45 degrees for ease holding his steering wheel.  Pt will increase their (L) thumb POTTS by 15-20 degrees for ease of medication management.  Pt will increase their (L) wrist flexion to at least 50 degrees for ease of dressing.  Pt will increase their (L)  strength by 8-10# for ease of carrying groceries  Pt will decrease their QuickDASH score by 10% in order to demo improvements in functional independence.     Frequency / Duration: Patient will be seen for  1-2x/week or a total of 8 visits over a 90 day period.  Treatment will include: Manual Therapy, Soft tissue mobilization, AROM, PROM, Strengthening, Therapeutic Activity, Moist heat, cryotherapy, Ultrasound, Whirlpool (fluidotherapy), Paraffin, Electrical Stim, Orthosis,  Neuromuscular Re-education, Patient education, Home exercise program,        Education or treatment limitation: None  Rehab Potential:excellent    QuickDASH Outcome Score  Score: (Patient-Rptd) 38.64 % (12/26/2024  9:25 AM)      Patient/Family/Caregiver was advised of these findings, precautions, and treatment options and has agreed to actively participate in planning and for this course of care.    Thank you for your referral. Please co-sign or sign and return this letter via fax as soon as possible to [phone]. If you have any questions, please contact me at Dept: [phone]    Sincerely,  Electronically signed  by therapist: Eda Figueredo, OT  Physician's certification required: Yes  I certify the need for these services furnished under this plan of treatment and while under my care.    X___________________________________________________ Date____________________    Certification From: 1/2/2025  To:4/2/2025

## 2025-01-02 NOTE — PATIENT INSTRUCTIONS
Access Code: [email or web address]  URL: [email or web address]  Date: 01/02/2025  Prepared by: Eda RODNEY MS, OTR/L    Exercises  - Key Pinch with Putty  - 1 x daily - 7 x weekly - 2 sets - 10 reps  - Thumb Radial Abduction with Putty Loop  - 2 x daily - 7 x weekly - 2 sets - 10 reps  - Thumb Palmar Abduction with Putty Loop  - 2 x daily - 7 x weekly - 2 sets - 10 reps  - Thumb Opposition with Putty  - 2 x daily - 7 x weekly - 2 sets - 10 reps

## 2025-01-03 ENCOUNTER — TELEPHONE (OUTPATIENT)
Dept: CARDIOLOGY | Age: 65
End: 2025-01-03

## 2025-01-06 ENCOUNTER — OFFICE VISIT (OUTPATIENT)
Dept: OCCUPATIONAL MEDICINE | Facility: HOSPITAL | Age: 65
End: 2025-01-06
Attending: ORTHOPAEDIC SURGERY
Payer: MEDICARE

## 2025-01-06 PROCEDURE — 97110 THERAPEUTIC EXERCISES: CPT

## 2025-01-06 NOTE — PROGRESS NOTES
Diagnosis:   Primary osteoarthritis of first carpometacarpal joint of left hand (M18.12)       Referring Provider: Agustín Colón  Date of Evaluation:    1/2/2025    Precautions:  Following [place] Protocols for CTR and CMC arthroplasty Next MD visit: 01/08/25  Date of Surgery: 10/31/24   Insurance Primary/Secondary: BCBS OUT OF STATE / N/A     # Auth Visits: 8            Doctor's Instructions: At 6 weeks post-op, patient may begin therapy for all fingers, wrist, and thumb AAROM and AROM with OT. Pinch retraining at 6 weeks- (emphasize CMC abduction, radial extension, and opposition to each finger) Continue orthosis after exercise and at night.    Subjective: Pt states no pain and no issues with his exercises. Pt arrived in orthosis.     Pain: 0/10      Objective: See exercises flowsheet below for exercises and activities performed today. All exercises performed bilaterally.      Assessment: Pt demo'd muscular tremors when performing isolated intrinsic strengthening today including palmar abduction and 1st dorsal interossei strengthening. Distal median nerve glides added to HEP. Pt demo'd independence in glides quickly.      Goals: (to be met in 8 visits)  Pt will be independent and compliant with comprehensive HEP to maintain progress achieved in OT  Pt will increase their (L) thumb radial abduction to at least 55 degrees for ease of typing.  Pt will increase their (L) thumb palmar abduction to at least 45 degrees for ease holding his steering wheel.  Pt will increase their (L) thumb POTTS by 15-20 degrees for ease of medication management.  Pt will increase their (L) wrist flexion to at least 50 degrees for ease of dressing.  Pt will increase their (L)  strength by 8-10# for ease of carrying groceries  Pt will decrease their QuickDASH score by 10% in order to demo improvements in functional independence.     Plan: Follow up on median nerve glides  Date 1/6/2025                  Visit # 2/8                                     Evaluation                 Manual                                                 Ther ex                                  Forearm flex/extensor stretch (blue web) 3x1, 20 seconds    2# wrist PRE flex/extend    1# hand dowel sup/pronation    Palmar abd with tennis ball 10x2    IF lifts 1st dorsal interossei strengthening with tennis ball 20x2    Distal median nerve glides 10x1    Red putty Ex: 10x2  -lumbrical extension                 HEP instruction                    Therapeutic Activity                                                 Neuromuscular Re-education                                                 Modalities                                                HEP:  Assignment date:   Red putty exercises-lateral pinch, thumb extension, thumb palmar abduction, and opposition  1/6/2025                 Charges: TE 3       Total Timed Treatment: 43 min  Total Treatment Time: 43 min

## 2025-01-07 ENCOUNTER — APPOINTMENT (OUTPATIENT)
Dept: CARDIOLOGY | Age: 65
End: 2025-01-07
Attending: INTERNAL MEDICINE

## 2025-01-07 DIAGNOSIS — E78.00 PURE HYPERCHOLESTEROLEMIA: ICD-10-CM

## 2025-01-07 DIAGNOSIS — I10 PRIMARY HYPERTENSION: ICD-10-CM

## 2025-01-07 DIAGNOSIS — R93.1 ELEVATED CORONARY ARTERY CALCIUM SCORE: ICD-10-CM

## 2025-01-07 DIAGNOSIS — E78.00 ELEVATED LDL CHOLESTEROL LEVEL: ICD-10-CM

## 2025-01-07 LAB
HEART RATE RESERVE PREDICTED: -12.82 BPM
RESTING HR ACHIEVED: 82 BPM
STRESS BASELINE BP: NORMAL MMHG
STRESS PEAK HR: 176 BPM
STRESS PERCENT HR: 113 %
STRESS POST ESTIMATED WORKLOAD: 7 METS
STRESS POST EXERCISE DUR MIN: 6 MIN
STRESS POST PEAK BP: NORMAL MMHG
STRESS TARGET HR: 156 BPM

## 2025-01-07 PROCEDURE — 93015 CV STRESS TEST SUPVJ I&R: CPT | Performed by: INTERNAL MEDICINE

## 2025-01-08 ENCOUNTER — APPOINTMENT (OUTPATIENT)
Dept: OCCUPATIONAL MEDICINE | Facility: HOSPITAL | Age: 65
End: 2025-01-08
Attending: ORTHOPAEDIC SURGERY
Payer: MEDICARE

## 2025-01-09 ENCOUNTER — OFFICE VISIT (OUTPATIENT)
Dept: OCCUPATIONAL MEDICINE | Facility: HOSPITAL | Age: 65
End: 2025-01-09
Attending: ORTHOPAEDIC SURGERY
Payer: MEDICARE

## 2025-01-09 PROCEDURE — 97110 THERAPEUTIC EXERCISES: CPT

## 2025-01-09 NOTE — PROGRESS NOTES
Diagnosis:   Primary osteoarthritis of first carpometacarpal joint of left hand (M18.12)       Referring Provider: Agustín Colón  Date of Evaluation:    1/2/2025    Precautions:  Following [place] Protocols for CTR and CMC arthroplasty Next MD visit: 01/08/25  Date of Surgery: 10/31/24   Insurance Primary/Secondary: BCBS OUT OF STATE / N/A     # Auth Visits: 8            Doctor's Instructions: At 6 weeks post-op, patient may begin therapy for all fingers, wrist, and thumb AAROM and AROM with OT. Pinch retraining at 6 weeks- (emphasize CMC abduction, radial extension, and opposition to each finger) Continue orthosis after exercise and at night.    Subjective: Pt states a little more pain/soreness d/t cold weather.    Pain: 1/10      Objective: See exercises flowsheet below for exercises and activities performed today. All exercises performed bilaterally.      Assessment: Pt demo'd more control and precision in exercises. Thenar eminence remains tight with STR. Pt demo'd great carry over of median nerve glides, min to no cues for accurate completion.      Goals: (to be met in 8 visits)  Pt will be independent and compliant with comprehensive HEP to maintain progress achieved in OT  Pt will increase their (L) thumb radial abduction to at least 55 degrees for ease of typing.  Pt will increase their (L) thumb palmar abduction to at least 45 degrees for ease holding his steering wheel.  Pt will increase their (L) thumb POTTS by 15-20 degrees for ease of medication management.  Pt will increase their (L) wrist flexion to at least 50 degrees for ease of dressing.  Pt will increase their (L)  strength by 8-10# for ease of carrying groceries  Pt will decrease their QuickDASH score by 10% in order to demo improvements in functional independence.     Plan: Continue strengthening as tolerated  Date 1/6/2025 1/9/2025               Visit # 2/8  3/8                                  Evaluation                 Manual                                                  Ther ex                                  Forearm flex/extensor stretch (blue web) 3x1, 20 seconds    2# wrist PRE flex/extend    1# hand dowel sup/pronation    Palmar abd with tennis ball 10x2    IF lifts 1st dorsal interossei strengthening with tennis ball 20x2    Distal median nerve glides 10x1    Red putty Ex: 10x2  -lumbrical extension Thumb radial abd 10x2    Thumb pal abd 10x2      Blue Web 10x2  -lumbrical squeeze  -lumbrical extension    Palmar abd with tennis ball 10x2    IF lifts 1st dorsal interossei strengthening with tennis ball and rubber band 20x2    Red putty   -5 coin retrieval  -hook presses  -lateral pinch    1.5# hand dowel sup/pronation 10x2    Green pin 3pt pinch velcro block removal and placement    Distal median nerve glides 10x1                 HEP instruction                    Therapeutic Activity                                                 Neuromuscular Re-education                                                 Modalities                                                HEP:  Assignment date:   Red putty exercises-lateral pinch, thumb extension, thumb palmar abduction, and opposition  1/6/2025                 Charges: TE 3       Total Timed Treatment: 40 min  Total Treatment Time: 40 min

## 2025-01-11 DIAGNOSIS — J45.50 SEVERE PERSISTENT ASTHMA WITHOUT COMPLICATION  (CMD): ICD-10-CM

## 2025-01-13 ENCOUNTER — OFFICE VISIT (OUTPATIENT)
Dept: OCCUPATIONAL MEDICINE | Facility: HOSPITAL | Age: 65
End: 2025-01-13
Attending: ORTHOPAEDIC SURGERY
Payer: MEDICARE

## 2025-01-13 PROCEDURE — 97112 NEUROMUSCULAR REEDUCATION: CPT

## 2025-01-13 PROCEDURE — 97110 THERAPEUTIC EXERCISES: CPT

## 2025-01-13 RX ORDER — FLUTICASONE FUROATE AND VILANTEROL TRIFENATATE 100; 25 UG/1; UG/1
POWDER RESPIRATORY (INHALATION)
Qty: 180 EACH | Refills: 1 | Status: SHIPPED | OUTPATIENT
Start: 2025-01-13 | End: 2025-01-17 | Stop reason: DRUGHIGH

## 2025-01-13 NOTE — PROGRESS NOTES
Diagnosis:   Primary osteoarthritis of first carpometacarpal joint of left hand (M18.12)       Referring Provider: Agustín Colón  Date of Evaluation:    1/2/2025    Precautions:  Following [place] Protocols for CTR and CMC arthroplasty Next MD visit: 02/08/25  Date of Surgery: 10/31/24   Insurance Primary/Secondary: BCBS OUT OF STATE / N/A     # Auth Visits: 8            Doctor's Instructions: At 6 weeks post-op, patient may begin therapy for all fingers, wrist, and thumb AAROM and AROM with OT. Pinch retraining at 6 weeks- (emphasize CMC abduction, radial extension, and opposition to each finger) Continue orthosis after exercise and at night.    Subjective: Pt states pain with the cold.     Pain: 2/10      Objective: See exercises flowsheet below for exercises and activities performed today. All exercises performed bilaterally.      Assessment: HEP updated to include strengthening with green tband. Pt demo'd independence in exercises within the first couple reps. Pt unable to maintain form with two rubber bands on the tennis ball, therefore, transition to one rubber band for better success.      Goals: (to be met in 8 visits)  Pt will be independent and compliant with comprehensive HEP to maintain progress achieved in OT  Pt will increase their (L) thumb radial abduction to at least 55 degrees for ease of typing.  Pt will increase their (L) thumb palmar abduction to at least 45 degrees for ease holding his steering wheel.  Pt will increase their (L) thumb POTTS by 15-20 degrees for ease of medication management.  Pt will increase their (L) wrist flexion to at least 50 degrees for ease of dressing.  Pt will increase their (L)  strength by 8-10# for ease of carrying groceries  Pt will decrease their QuickDASH score by 10% in order to demo improvements in functional independence.     Plan: Continue strengthening as tolerated, follow up on tband exercises   Date 1/6/2025 1/9/2025 1/13/2025              Visit # 2/8   3/8  4/8                                Evaluation                 Manual                                                 Ther ex                                  Forearm flex/extensor stretch (blue web) 3x1, 20 seconds    2# wrist PRE flex/extend    1# hand dowel sup/pronation    Palmar abd with tennis ball 10x2    IF lifts 1st dorsal interossei strengthening with tennis ball 20x2    Distal median nerve glides 10x1    Red putty Ex: 10x2  -lumbrical extension Thumb radial abd 10x2    Thumb pal abd 10x2      Blue Web 10x2  -lumbrical squeeze  -lumbrical extension    Palmar abd with tennis ball 10x2    IF lifts 1st dorsal interossei strengthening with tennis ball and rubber band 20x2    Red putty   -5 coin retrieval  -hook presses  -lateral pinch    1.5# hand dowel sup/pronation 10x2    Green pin 3pt pinch velcro block removal and placement    Distal median nerve glides 10x1   Thumb radial abd 20x1    Thumb pal abd 20x1      Blue Web 10x2  -lumbrical squeeze  -lumbrical extension  -lateral pinch      Distal median nerve glides 5x1      1.5# hand dowel sup/pronation 15x2    Green tband wrist flex/extend 10x2    Palmar abd with tennis ball 15x2    IF lifts 1st dorsal interossei strengthening with tennis ball and rubber band 20x2    Green pin 3pt pinch sponge transportation                     HEP instruction                    Therapeutic Activity                                                 Neuromuscular Re-education                                   Isometric thumb radial abduction, palmar abduction, and extension 10x2             Modalities                                                HEP:  Assignment date:   Red putty exercises-lateral pinch, thumb extension, thumb palmar abduction, and opposition  1/2/2025   Median nerve glides  1/6/25   Wrist curls  1/13/2025       Access Code: [email or web address]  URL: [email or web address]  Date: 01/13/2025  Prepared by: Eda RODNEY MS, OTR/L    Exercises  - Wrist  Extension with Resistance  - 1 x daily - 7 x weekly - 2 sets - 10 reps  - Wrist Flexion with Resistance  - 1 x daily - 7 x weekly - 2 sets - 10 reps      Charges: NMRE 1 (8), TE 2 (30)      Total Timed Treatment: 38 min  Total Treatment Time: 38 min

## 2025-01-15 ENCOUNTER — OFFICE VISIT (OUTPATIENT)
Dept: OCCUPATIONAL MEDICINE | Facility: HOSPITAL | Age: 65
End: 2025-01-15
Attending: ORTHOPAEDIC SURGERY
Payer: MEDICARE

## 2025-01-15 PROCEDURE — 97110 THERAPEUTIC EXERCISES: CPT

## 2025-01-15 NOTE — PROGRESS NOTES
Diagnosis:   Primary osteoarthritis of first carpometacarpal joint of left hand (M18.12)       Referring Provider: Agustín Colón  Date of Evaluation:    1/2/2025    Precautions:  Following [place] Protocols for CTR and CMC arthroplasty Next MD visit: 02/08/25  Date of Surgery: 10/31/24   Insurance Primary/Secondary: BCBS OUT OF STATE / N/A     # Auth Visits: 8            Doctor's Instructions: At 6 weeks post-op, patient may begin therapy for all fingers, wrist, and thumb AAROM and AROM with OT. Pinch retraining at 6 weeks- (emphasize CMC abduction, radial extension, and opposition to each finger) Continue orthosis after exercise and at night.    Subjective: Pt states compliance with his HEP.     Pain: 0/10      Objective: See exercises flowsheet below for exercises and activities performed today. All exercises performed bilaterally.    ROM: (* denotes performed with pain)  Wrist Eval Wrist 1/15/2025   Flexion: R 45, L 35  Extension: R 50, L 60  Ulnar Deviation: R 35, L 30  Radial Deviation R 15, L 10 Flexion: L 40  Extension: L 60       AROM/PROM:(Degrees)  RIGHT HAND:    Thumb eval Thumb 1/15/2025   MP 0/50 0/55        IP 0/74 0/56   POTTS 124 111   Eval:  Thumb radial abduction: 55 degrees  Thumb Palmar abduction: 50 degrees  1/15/2025: (same as eval)  Thumb radial abduction: 55 degrees  Thumb Palmar abduction: 50 degrees    Assessment: Pt mid treatment therefore AROM measurements taken. Pt making progress towards goals, however, remains stiff withing the L thumb. Strength not measured today. Although strong, pt demo'd difficulty with stability of his L thumb especially in palmar abduction.       Goals: (to be met in 8 visits)  Pt will be independent and compliant with comprehensive HEP to maintain progress achieved in OT  Pt will increase their (L) thumb radial abduction to at least 55 degrees for ease of typing.  Pt will increase their (L) thumb palmar abduction to at least 45 degrees for ease holding his  steering wheel.  Pt will increase their (L) thumb POTTS by 15-20 degrees for ease of medication management.  Pt will increase their (L) wrist flexion to at least 50 degrees for ease of dressing.  Pt will increase their (L)  strength by 8-10# for ease of carrying groceries  Pt will decrease their QuickDASH score by 10% in order to demo improvements in functional independence.     Plan: Possibly add rubber band exercises to HEP, TBD  Date 1/6/2025  1/9/2025 1/13/2025  1/15/2025            Visit # 2/8  3/8  4/8  5/8                              Evaluation       Measurements taken           Manual                                                 Ther ex                                  Forearm flex/extensor stretch (blue web) 3x1, 20 seconds    2# wrist PRE flex/extend    1# hand dowel sup/pronation    Palmar abd with tennis ball 10x2    IF lifts 1st dorsal interossei strengthening with tennis ball 20x2    Distal median nerve glides 10x1    Red putty Ex: 10x2  -lumbrical extension Thumb radial abd 10x2    Thumb pal abd 10x2      Blue Web 10x2  -lumbrical squeeze  -lumbrical extension    Palmar abd with tennis ball 10x2    IF lifts 1st dorsal interossei strengthening with tennis ball and rubber band 20x2    Red putty   -5 coin retrieval  -hook presses  -lateral pinch    1.5# hand dowel sup/pronation 10x2    Green pin 3pt pinch velcro block removal and placement    Distal median nerve glides 10x1   Thumb radial abd 20x1    Thumb pal abd 20x1      Blue Web 10x2  -lumbrical squeeze  -lumbrical extension  -lateral pinch      Distal median nerve glides 5x1      1.5# hand dowel sup/pronation 15x2    Green tband wrist flex/extend 10x2    Palmar abd with tennis ball 15x2    IF lifts 1st dorsal interossei strengthening with tennis ball and rubber band 20x2    Green pin 3pt pinch sponge transportation         Thumb radial abd 20x1    Thumb pal abd 20x1    2# wrist flex/extend 10x2    Composite flexion into wrist flexion/extension  10x1    IF lifts 1st dorsal interossei strengthening with tennis ball and rubber band 20x2    Rubber band hand  -radial abd  -radial add  -palmar abduction    1.5# hand dowel sup/pronation 15x2    Green pin lateral pinch velcro block removal and placement    Opposition 10x2    Thumb flex/extend 10x2           HEP instruction                    Therapeutic Activity                                                 Neuromuscular Re-education                                   Isometric thumb radial abduction, palmar abduction, and extension 10x2             Modalities                                                HEP:  Assignment date:   Red putty exercises-lateral pinch, thumb extension, thumb palmar abduction, and opposition  1/2/2025   Median nerve glides  1/6/25   Wrist curls  1/13/2025       Access Code: [email or web address]  URL: [email or web address]  Date: 01/13/2025  Prepared by: Eda RODNEY MS, OTR/L    Exercises  - Wrist Extension with Resistance  - 1 x daily - 7 x weekly - 2 sets - 10 reps  - Wrist Flexion with Resistance  - 1 x daily - 7 x weekly - 2 sets - 10 reps      Charges: TE  3  Total Timed Treatment: 40 min  Total Treatment Time: 40 min

## 2025-01-17 ENCOUNTER — TELEPHONE (OUTPATIENT)
Dept: PULMONOLOGY | Age: 65
End: 2025-01-17

## 2025-01-17 DIAGNOSIS — J45.51 SEVERE PERSISTENT ASTHMA WITH ACUTE EXACERBATION  (CMD): Primary | ICD-10-CM

## 2025-01-17 RX ORDER — FLUTICASONE FUROATE AND VILANTEROL 200; 25 UG/1; UG/1
1 POWDER RESPIRATORY (INHALATION) DAILY
Qty: 3 EACH | Refills: 1 | Status: SHIPPED | OUTPATIENT
Start: 2025-01-17 | End: 2025-04-17

## 2025-01-20 ENCOUNTER — APPOINTMENT (OUTPATIENT)
Dept: OCCUPATIONAL MEDICINE | Facility: HOSPITAL | Age: 65
End: 2025-01-20
Attending: ORTHOPAEDIC SURGERY
Payer: MEDICARE

## 2025-01-22 ENCOUNTER — OFFICE VISIT (OUTPATIENT)
Dept: OCCUPATIONAL MEDICINE | Facility: HOSPITAL | Age: 65
End: 2025-01-22
Attending: ORTHOPAEDIC SURGERY
Payer: MEDICARE

## 2025-01-22 PROCEDURE — 97110 THERAPEUTIC EXERCISES: CPT

## 2025-01-22 NOTE — PROGRESS NOTES
Diagnosis:   Primary osteoarthritis of first carpometacarpal joint of left hand (M18.12)       Referring Provider: Agustín Colón  Date of Evaluation:    1/2/2025    Precautions:  Following [place] Protocols for CTR and CMC arthroplasty Next MD visit: 02/08/25  Date of Surgery: 10/31/24   Insurance Primary/Secondary: BCBS OUT OF STATE / N/A     # Auth Visits: 8            Doctor's Instructions: At 6 weeks post-op, patient may begin therapy for all fingers, wrist, and thumb AAROM and AROM with OT. Pinch retraining at 6 weeks- (emphasize CMC abduction, radial extension, and opposition to each finger) Continue orthosis after exercise and at night.    Subjective: Pt states more soreness after coming home from vacation.    Pain: 2-3/10      Objective: See exercises flowsheet below for exercises and activities performed today. All exercises performed bilaterally.      Assessment: Rubber band exercises added to HEP. OT trained and demo'd rubber band exercises to patient, pt returned demo with min to no v/c until able to display independence.        Goals: (to be met in 8 visits)  Pt will be independent and compliant with comprehensive HEP to maintain progress achieved in OT  Pt will increase their (L) thumb radial abduction to at least 55 degrees for ease of typing.  Pt will increase their (L) thumb palmar abduction to at least 45 degrees for ease holding his steering wheel.  Pt will increase their (L) thumb POTTS by 15-20 degrees for ease of medication management.  Pt will increase their (L) wrist flexion to at least 50 degrees for ease of dressing.  Pt will increase their (L)  strength by 8-10# for ease of carrying groceries  Pt will decrease their QuickDASH score by 10% in order to demo improvements in functional independence.     Plan: Continue strengthening as tolerated   Date 1/6/2025  1/9/2025 1/13/2025  1/15/2025   1/22/2025          Visit # 2/8  3/8  4/8  5/8 6/8                            Evaluation        Measurements taken           Manual                                                Ther ex                                  Forearm flex/extensor stretch (blue web) 3x1, 20 seconds    2# wrist PRE flex/extend    1# hand dowel sup/pronation    Palmar abd with tennis ball 10x2    IF lifts 1st dorsal interossei strengthening with tennis ball 20x2    Distal median nerve glides 10x1    Red putty Ex: 10x2  -lumbrical extension Thumb radial abd 10x2    Thumb pal abd 10x2      Blue Web 10x2  -lumbrical squeeze  -lumbrical extension    Palmar abd with tennis ball 10x2    IF lifts 1st dorsal interossei strengthening with tennis ball and rubber band 20x2    Red putty   -5 coin retrieval  -hook presses  -lateral pinch    1.5# hand dowel sup/pronation 10x2    Green pin 3pt pinch velcro block removal and placement    Distal median nerve glides 10x1   Thumb radial abd 20x1    Thumb pal abd 20x1      Blue Web 10x2  -lumbrical squeeze  -lumbrical extension  -lateral pinch      Distal median nerve glides 5x1      1.5# hand dowel sup/pronation 15x2    Green tband wrist flex/extend 10x2    Palmar abd with tennis ball 15x2    IF lifts 1st dorsal interossei strengthening with tennis ball and rubber band 20x2    Green pin 3pt pinch sponge transportation         Thumb radial abd 20x1    Thumb pal abd 20x1    2# wrist flex/extend 10x2    Composite flexion into wrist flexion/extension 10x1    IF lifts 1st dorsal interossei strengthening with tennis ball and rubber band 20x2    Rubber band hand  -radial abd  -radial add  -palmar abduction    1.5# hand dowel sup/pronation 15x2    Green pin lateral pinch velcro block removal and placement    Opposition 10x2    Thumb flex/extend 10x2 Thumb radial abd 20x1    Thumb pal abd 20x1    Rubber Band Ex:  -thumb radial abduction  -thumb flexion  -lumbrical extension  -digital adduction  10x2    IF lifts 1st dorsal interossei strengthening with tennis ball and rubber band 20x2    2# hand dowel  sup/pronation 15x2    Black Web 10x2  -lumbrical squeeze 10x2  - 10x1                 HEP instruction                    Therapeutic Activity                                                 Neuromuscular Re-education                                   Isometric thumb radial abduction, palmar abduction, and extension 10x2             Modalities                                                HEP:  Assignment date:   Red putty exercises-lateral pinch, thumb extension, thumb palmar abduction, and opposition  1/2/2025   Median nerve glides  1/6/25   Wrist curls  1/13/2025       Access Code: [email or web address]  URL: [email or web address]  Date: 01/22/2025  Prepared by: Eda RODNEY MS, OTR/L    Exercises  - Wrist Extension with Resistance  - 1 x daily - 7 x weekly - 2 sets - 10 reps  - Wrist Flexion with Resistance  - 1 x daily - 7 x weekly - 2 sets - 10 reps  - Thumb Radial Abduction with Rubber Band - Palm Down  - 1 x daily - 4 x weekly - 2 sets - 10 reps  - Thumb Flexion with Resistance  - 1 x daily - 4 x weekly - 2 sets - 10 reps  - Quick Finger Spreading with Rubber Band  - 1 x daily - 4 x weekly - 2 sets - 10 reps      Charges: TE  3  Total Timed Treatment: 42 min  Total Treatment Time: 42 min

## 2025-01-27 ENCOUNTER — OFFICE VISIT (OUTPATIENT)
Dept: OCCUPATIONAL MEDICINE | Facility: HOSPITAL | Age: 65
End: 2025-01-27
Attending: ORTHOPAEDIC SURGERY
Payer: MEDICARE

## 2025-01-27 PROCEDURE — 97110 THERAPEUTIC EXERCISES: CPT

## 2025-01-27 NOTE — PROGRESS NOTES
Diagnosis:   Primary osteoarthritis of first carpometacarpal joint of left hand (M18.12)       Referring Provider: Agustín Colón  Date of Evaluation:    1/2/2025    Precautions:  Following [place] Protocols for CTR and CMC arthroplasty Next MD visit: 02/08/25  Date of Surgery: 10/31/24   Insurance Primary/Secondary: BCBS OUT OF STATE / N/A     # Auth Visits: 8            Doctor's Instructions: At 6 weeks post-op, patient may begin therapy for all fingers, wrist, and thumb AAROM and AROM with OT. Pinch retraining at 6 weeks- (emphasize CMC abduction, radial extension, and opposition to each finger) Continue orthosis after exercise and at night.    Subjective: Pt states feeling good. Pt is unsure of discharge next session. Pt feels as though he may need more time in therapy    Pain: 0/10      Objective: See exercises flowsheet below for exercises and activities performed today. All exercises performed bilaterally.      Assessment: Pt graded up to the green putty for extrinsic and intrinsic hand muscle strengthening. Pt better able to stabilize his L thumb reducing subluxation. Opposition remains challenging for pt as he tends to revert to flexion.       Goals: (to be met in 8 visits)  Pt will be independent and compliant with comprehensive HEP to maintain progress achieved in OT  Pt will increase their (L) thumb radial abduction to at least 55 degrees for ease of typing.  Pt will increase their (L) thumb palmar abduction to at least 45 degrees for ease holding his steering wheel.  Pt will increase their (L) thumb POTTS by 15-20 degrees for ease of medication management.  Pt will increase their (L) wrist flexion to at least 50 degrees for ease of dressing.  Pt will increase their (L)  strength by 8-10# for ease of carrying groceries  Pt will decrease their QuickDASH score by 10% in order to demo improvements in functional independence.     Plan: Progress note next session  Date 1/6/2025 1/9/2025 1/13/2025   1/15/2025   1/22/2025  1/27/2025       Visit # 2/8  3/8 4/8 5/8 6/8 7/8                          Evaluation       Measurements taken           Manual                                                Ther ex                                  Forearm flex/extensor stretch (blue web) 3x1, 20 seconds    2# wrist PRE flex/extend    1# hand dowel sup/pronation    Palmar abd with tennis ball 10x2    IF lifts 1st dorsal interossei strengthening with tennis ball 20x2    Distal median nerve glides 10x1    Red putty Ex: 10x2  -lumbrical extension Thumb radial abd 10x2    Thumb pal abd 10x2      Blue Web 10x2  -lumbrical squeeze  -lumbrical extension    Palmar abd with tennis ball 10x2    IF lifts 1st dorsal interossei strengthening with tennis ball and rubber band 20x2    Red putty   -5 coin retrieval  -hook presses  -lateral pinch    1.5# hand dowel sup/pronation 10x2    Green pin 3pt pinch velcro block removal and placement    Distal median nerve glides 10x1   Thumb radial abd 20x1    Thumb pal abd 20x1      Blue Web 10x2  -lumbrical squeeze  -lumbrical extension  -lateral pinch      Distal median nerve glides 5x1      1.5# hand dowel sup/pronation 15x2    Green tband wrist flex/extend 10x2    Palmar abd with tennis ball 15x2    IF lifts 1st dorsal interossei strengthening with tennis ball and rubber band 20x2    Green pin 3pt pinch sponge transportation         Thumb radial abd 20x1    Thumb pal abd 20x1    2# wrist flex/extend 10x2    Composite flexion into wrist flexion/extension 10x1    IF lifts 1st dorsal interossei strengthening with tennis ball and rubber band 20x2    Rubber band hand  -radial abd  -radial add  -palmar abduction    1.5# hand dowel sup/pronation 15x2    Green pin lateral pinch velcro block removal and placement    Opposition 10x2    Thumb flex/extend 10x2 Thumb radial abd 20x1    Thumb pal abd 20x1    Rubber Band Ex:  -thumb radial abduction  -thumb flexion  -lumbrical extension  -digital  adduction  10x2    IF lifts 1st dorsal interossei strengthening with tennis ball and rubber band 20x2    2# hand dowel sup/pronation 15x2    Black Web 10x2  -lumbrical squeeze 10x2  - 10x1         Thumb radial abd 20x1    Thumb pal abd 20x1    Green Putty Ex:   -  -opposition  -lateral pinch  -thumb extension (small piece)  (10 minutes)    Palmar abd with tennis ball 10x2    2# hand dowel sup/pronation 15x2    Green 15# flexbar  -Bilateral sup/pronation  10x2    Red 10# Flexbar  -unilateral sup/pronation  10x2                 HEP instruction                    Therapeutic Activity                                                 Neuromuscular Re-education                                   Isometric thumb radial abduction, palmar abduction, and extension 10x2             Modalities                                                HEP:  Assignment date:   Red putty exercises-lateral pinch, thumb extension, thumb palmar abduction, and opposition  1/2/2025   Median nerve glides  1/6/25   Wrist curls  1/13/2025       Access Code: [email or web address]  URL: [email or web address]  Date: 01/22/2025  Prepared by: Eda RODNEY MS, OTR/L    Exercises  - Wrist Extension with Resistance  - 1 x daily - 7 x weekly - 2 sets - 10 reps  - Wrist Flexion with Resistance  - 1 x daily - 7 x weekly - 2 sets - 10 reps  - Thumb Radial Abduction with Rubber Band - Palm Down  - 1 x daily - 4 x weekly - 2 sets - 10 reps  - Thumb Flexion with Resistance  - 1 x daily - 4 x weekly - 2 sets - 10 reps  - Quick Finger Spreading with Rubber Band  - 1 x daily - 4 x weekly - 2 sets - 10 reps      Charges: TE  3   Total Timed Treatment: 40 min  Total Treatment Time: 40 min

## 2025-01-28 ENCOUNTER — TELEPHONE (OUTPATIENT)
Dept: CARDIOLOGY | Age: 65
End: 2025-01-28

## 2025-01-29 ENCOUNTER — LAB REQUISITION (OUTPATIENT)
Dept: LAB | Facility: HOSPITAL | Age: 65
End: 2025-01-29
Payer: MEDICARE

## 2025-01-29 ENCOUNTER — OFFICE VISIT (OUTPATIENT)
Dept: OCCUPATIONAL MEDICINE | Facility: HOSPITAL | Age: 65
End: 2025-01-29
Attending: ORTHOPAEDIC SURGERY
Payer: MEDICARE

## 2025-01-29 DIAGNOSIS — Z01.89 ENCOUNTER FOR OTHER SPECIFIED SPECIAL EXAMINATIONS: ICD-10-CM

## 2025-01-29 PROCEDURE — 88313 SPECIAL STAINS GROUP 2: CPT | Performed by: ORTHOPAEDIC SURGERY

## 2025-01-29 PROCEDURE — 88305 TISSUE EXAM BY PATHOLOGIST: CPT | Performed by: ORTHOPAEDIC SURGERY

## 2025-01-29 PROCEDURE — 97110 THERAPEUTIC EXERCISES: CPT

## 2025-01-29 NOTE — PROGRESS NOTES
Diagnosis:   Primary osteoarthritis of first carpometacarpal joint of left hand (M18.12)       Referring Provider: Agustín Colón  Date of Evaluation:    1/2/2025    Precautions:  Following [place] Protocols for CTR and CMC arthroplasty Next MD visit: 02/08/25  Date of Surgery: 10/31/24   Insurance Primary/Secondary: BCBS OUT OF STATE / N/A     # Auth Visits: 8            Doctor's Instructions: At 6 weeks post-op, patient may begin therapy for all fingers, wrist, and thumb AAROM and AROM with OT. Pinch retraining at 6 weeks- (emphasize CMC abduction, radial extension, and opposition to each finger) Continue orthosis after exercise and at night.      Discharge Summary  Pt has attended 8, cancelled 1, and no shown 0 visits in Occupational Therapy.     Subjective: Pt states compliance with his HEP. Pt feels he is mostly back to his normal.     Assessment:  Pt seen for 8 skilled OT visits in which he improved his L hand AROM and strength. Pt met 6/7 goals and progressed in the other. Pt able to demo independence in his HEP. Pt is now cleared for discharge from OT services.    Objective Data:     ROM: (* denotes performed with pain)  Wrist Eval Wrist 1/29/2025   Flexion: R 45, L 35  Extension: R 50, L 60  Ulnar Deviation: R 35, L 30  Radial Deviation R 15, L 10 Flexion: L 45     AROM:(Degrees)    LEFT HAND Eval:    Thumb   MP 0/50       IP 0/53   POTTS 103   Thumb radial abduction: 45 degrees  Thumb Palmar abduction: 35 degrees    LEFT HAND 1/29/2025:    Thumb   MP 0/55       IP 0/65   POTTS 120   Thumb radial abduction: 50 degrees  Thumb Palmar abduction: 45 degrees      Strength (lbs) Left Average Eval Left Average 1/29/2025     : 45 53 (+8)   2 pt Pinch: 10 14   3 pt Pinch: 14 16   Lateral Pinch: 17 18       Goals:   Pt will be independent and compliant with comprehensive HEP to maintain progress achieved in OT-MET  Pt will increase their (L) thumb radial abduction to at least 55 degrees for ease of typing.-Progressed  not met  Pt will increase their (L) thumb palmar abduction to at least 45 degrees for ease holding his steering wheel.-MET  Pt will increase their (L) thumb POTTS by 15-20 degrees for ease of medication management.-MET  Pt will increase their (L) wrist flexion to at least 50 degrees for ease of dressing.-MET within parameters  Pt will increase their (L)  strength by 8-10# for ease of carrying groceries-MET  Pt will decrease their QuickDASH score by 10% in order to demo improvements in functional independence-MET.     Plan: Discharge pt    Patient/Family/Caregiver was advised of these findings, precautions, and treatment options and has agreed to actively participate in planning and for this course of care.    Thank you for your referral. If you have any questions, please contact me at Dept: [phone].    Sincerely,  Electronically signed by therapist: Eda Figueredo OT    Physician's certification required: No  Please co-sign or sign and return this letter via fax as soon as possible to [phone].   I certify the need for these services furnished under this plan of treatment and while under my care.    X___________________________________________________ Date____________________    Certification From: 1/29/2025  To:4/29/2025                Treatment Objective: See exercises flowsheet below for exercises and activities performed today. All exercises performed bilaterally.      Date 1/6/2025  1/9/2025 1/13/2025  1/15/2025   1/22/2025  1/27/2025  1/29/2025    Visit # 2/8  3/8  4/8  5/8 6/8  7/8  8/8                     Evaluation       Measurements taken          Manual                                            Ther ex                                Forearm flex/extensor stretch (blue web) 3x1, 20 seconds    2# wrist PRE flex/extend    1# hand dowel sup/pronation    Palmar abd with tennis ball 10x2    IF lifts 1st dorsal interossei strengthening with tennis ball 20x2    Distal median nerve glides 10x1    Red  putty Ex: 10x2  -lumbrical extension Thumb radial abd 10x2    Thumb pal abd 10x2      Blue Web 10x2  -lumbrical squeeze  -lumbrical extension    Palmar abd with tennis ball 10x2    IF lifts 1st dorsal interossei strengthening with tennis ball and rubber band 20x2    Red putty   -5 coin retrieval  -hook presses  -lateral pinch    1.5# hand dowel sup/pronation 10x2    Green pin 3pt pinch velcro block removal and placement    Distal median nerve glides 10x1   Thumb radial abd 20x1    Thumb pal abd 20x1      Blue Web 10x2  -lumbrical squeeze  -lumbrical extension  -lateral pinch      Distal median nerve glides 5x1      1.5# hand dowel sup/pronation 15x2    Green tband wrist flex/extend 10x2    Palmar abd with tennis ball 15x2    IF lifts 1st dorsal interossei strengthening with tennis ball and rubber band 20x2    Green pin 3pt pinch sponge transportation         Thumb radial abd 20x1    Thumb pal abd 20x1    2# wrist flex/extend 10x2    Composite flexion into wrist flexion/extension 10x1    IF lifts 1st dorsal interossei strengthening with tennis ball and rubber band 20x2    Rubber band hand  -radial abd  -radial add  -palmar abduction    1.5# hand dowel sup/pronation 15x2    Green pin lateral pinch velcro block removal and placement    Opposition 10x2    Thumb flex/extend 10x2 Thumb radial abd 20x1    Thumb pal abd 20x1    Rubber Band Ex:  -thumb radial abduction  -thumb flexion  -lumbrical extension  -digital adduction  10x2    IF lifts 1st dorsal interossei strengthening with tennis ball and rubber band 20x2    2# hand dowel sup/pronation 15x2    Black Web 10x2  -lumbrical squeeze 10x2  - 10x1         Thumb radial abd 20x1    Thumb pal abd 20x1    Green Putty Ex:   -  -opposition  -lateral pinch  -thumb extension (small piece)  (10 minutes)    Palmar abd with tennis ball 10x2    2# hand dowel sup/pronation 15x2    Green 15# flexbar  -Bilateral sup/pronation  10x2    Red 10# Flexbar  -unilateral  sup/pronation  10x2           Thumb radial abd 20x1    Thumb pal abd 20x1    Black Web forearm flexor/extensor stretch 3x1, 20-30 seconds    Black Web 10x2  -lumbrical squeeze 10x2  - 10x1    Blue tband wrist flex/extend 10x2      Green 15# flexbar  -Bilateral sup/pronation  10x2         HEP instruction                  Therapeutic Activity                                             Neuromuscular Re-education                                 Isometric thumb radial abduction, palmar abduction, and extension 10x2           Modalities                                            HEP:  Assignment date:   Red putty exercises-lateral pinch, thumb extension, thumb palmar abduction, and opposition  1/2/2025   Median nerve glides  1/6/25   Wrist curls  1/13/2025       Access Code: [email or web address]  URL: [email or web address]  Date: 01/22/2025  Prepared by: Eda RODNEY MS, OTR/L    Exercises  - Wrist Extension with Resistance  - 1 x daily - 7 x weekly - 2 sets - 10 reps  - Wrist Flexion with Resistance  - 1 x daily - 7 x weekly - 2 sets - 10 reps  - Thumb Radial Abduction with Rubber Band - Palm Down  - 1 x daily - 4 x weekly - 2 sets - 10 reps  - Thumb Flexion with Resistance  - 1 x daily - 4 x weekly - 2 sets - 10 reps  - Quick Finger Spreading with Rubber Band  - 1 x daily - 4 x weekly - 2 sets - 10 reps      Charges: TE  2   Total Timed Treatment: 30 min  Total Treatment Time: 33 min

## 2025-01-30 ENCOUNTER — APPOINTMENT (OUTPATIENT)
Dept: PULMONOLOGY | Age: 65
End: 2025-01-30

## 2025-02-03 ENCOUNTER — APPOINTMENT (OUTPATIENT)
Dept: OCCUPATIONAL MEDICINE | Facility: HOSPITAL | Age: 65
End: 2025-02-03
Attending: ORTHOPAEDIC SURGERY
Payer: MEDICARE

## 2025-02-05 ENCOUNTER — APPOINTMENT (OUTPATIENT)
Dept: OCCUPATIONAL MEDICINE | Facility: HOSPITAL | Age: 65
End: 2025-02-05
Attending: ORTHOPAEDIC SURGERY
Payer: MEDICARE

## 2025-04-10 ENCOUNTER — TELEPHONE (OUTPATIENT)
Dept: FAMILY MEDICINE CLINIC | Facility: CLINIC | Age: 65
End: 2025-04-10

## 2025-04-10 NOTE — TELEPHONE ENCOUNTER
GEMINI Acuña - ANDRE - medication removed from patient's chart: selegiline    Call received from Patt at [facility] #[id number].    Requesting refill on patient's Selegiline 5 mg, last prescribed by GEMINI Acuña on 24.    Last office visit 10/16/24 for physical with GEMINI Acuña.     Dispenses of selegiline 5 m24 quantity 180, 90 day supply  10/18/24 quantity 110, 55 day supply (written by Henri Bennett)    RN noted on medication list that patient has marked \"not taking\" with today's date.     Spoke to patient, full name and date of birth verified.  Patient stated he has been prescribed something different, does not need refill of the selegiline.     RN called [facility] back, spoke to Patt - she will discontinue selegiline on the patient's medication list.    Medication list updated.

## 2025-04-17 ENCOUNTER — TELEPHONE (OUTPATIENT)
Dept: FAMILY MEDICINE CLINIC | Facility: CLINIC | Age: 65
End: 2025-04-17

## 2025-04-17 DIAGNOSIS — J45.30 MILD PERSISTENT ASTHMA WITHOUT COMPLICATION (HCC): Primary | ICD-10-CM

## 2025-04-17 NOTE — TELEPHONE ENCOUNTER
Received call from [facility] office asking for referral for patient to be seen today  Referral was requested by  at [facility] office  I informed office that we are not with [facility] and patient did then realized where referral was to come from  And we were called in error

## 2025-04-17 NOTE — TELEPHONE ENCOUNTER
[facility] referral pended for review. Thank you.    Spoke to patient (name and  of patient verified). He is calling to request referral to pulmonary specialist. Insurance on file verified.   Patient would also like to schedule physical exam with primary care provider, Dr. Love. PCP removal request order placed per protocol.  Transferred to SUNNY Espinoza for assistance with scheduling physical exam  Future Appointments   Date Time Provider Department Center   2025  5:15 PM Wilberto Acuña PA-C [facility] [place]   2025  3:45 PM Christina Glover MD [id number] [place]     Diagnosis noted per progress note by Dejan Coe 24:  \"Assessment/Plan:  Diagnoses and associated orders for this visit:  1. Mild persistent asthma without complication (CMD)\"

## 2025-04-17 NOTE — TELEPHONE ENCOUNTER
Patient contacted. Verified name and date of birth.  Patient informed of referral approval and verbalized understanding.

## 2025-04-17 NOTE — TELEPHONE ENCOUNTER
This encounter was signed, unable to pend referral for provider. Please see telephone encounter 4/17/25.

## 2025-04-18 ENCOUNTER — PATIENT OUTREACH (OUTPATIENT)
Dept: CASE MANAGEMENT | Age: 65
End: 2025-04-18

## 2025-04-18 NOTE — PROCEDURES
Received order requesting to update Primary Care Physician (PCP) to Dr. Joann Love is Approved and finalized on April 18, 2025.    [facility] Attributed PCP is Joann Love MD.

## 2025-04-22 ENCOUNTER — OFFICE VISIT (OUTPATIENT)
Dept: FAMILY MEDICINE CLINIC | Facility: CLINIC | Age: 65
End: 2025-04-22
Payer: MEDICARE

## 2025-04-22 VITALS
HEIGHT: 71 IN | DIASTOLIC BLOOD PRESSURE: 80 MMHG | HEART RATE: 80 BPM | BODY MASS INDEX: 25.76 KG/M2 | WEIGHT: 184 LBS | SYSTOLIC BLOOD PRESSURE: 131 MMHG

## 2025-04-22 DIAGNOSIS — Z00.00 ROUTINE GENERAL MEDICAL EXAMINATION AT A HEALTH CARE FACILITY: Primary | ICD-10-CM

## 2025-04-22 DIAGNOSIS — J45.30 MILD PERSISTENT ASTHMA WITHOUT COMPLICATION (HCC): ICD-10-CM

## 2025-04-22 DIAGNOSIS — E78.2 MIXED HYPERLIPIDEMIA: ICD-10-CM

## 2025-04-22 PROCEDURE — 96160 PT-FOCUSED HLTH RISK ASSMT: CPT | Performed by: PHYSICIAN ASSISTANT

## 2025-04-22 PROCEDURE — G0439 PPPS, SUBSEQ VISIT: HCPCS | Performed by: PHYSICIAN ASSISTANT

## 2025-04-22 PROCEDURE — 3075F SYST BP GE 130 - 139MM HG: CPT | Performed by: PHYSICIAN ASSISTANT

## 2025-04-22 PROCEDURE — 3008F BODY MASS INDEX DOCD: CPT | Performed by: PHYSICIAN ASSISTANT

## 2025-04-22 PROCEDURE — 3079F DIAST BP 80-89 MM HG: CPT | Performed by: PHYSICIAN ASSISTANT

## 2025-04-22 RX ORDER — ASPIRIN 81 MG/1
81 TABLET ORAL DAILY
COMMUNITY
Start: 2024-11-19

## 2025-04-22 RX ORDER — ROSUVASTATIN CALCIUM 10 MG/1
10 TABLET, COATED ORAL DAILY
COMMUNITY

## 2025-04-22 RX ORDER — ERGOCALCIFEROL 1.25 MG/1
1.25 CAPSULE, LIQUID FILLED ORAL WEEKLY
COMMUNITY
Start: 2024-06-10 | End: 2025-04-22

## 2025-04-22 RX ORDER — ALBUTEROL SULFATE 90 UG/1
2 INHALANT RESPIRATORY (INHALATION) EVERY 4 HOURS PRN
Qty: 18 G | Refills: 5 | Status: SHIPPED | OUTPATIENT
Start: 2025-04-22

## 2025-04-22 RX ORDER — PRIMIDONE 50 MG/1
50 TABLET ORAL NIGHTLY
COMMUNITY
Start: 2025-01-28

## 2025-04-22 RX ORDER — FLUTICASONE FUROATE AND VILANTEROL TRIFENATATE 200; 25 UG/1; UG/1
POWDER RESPIRATORY (INHALATION)
COMMUNITY
Start: 2025-04-21

## 2025-04-22 NOTE — PROGRESS NOTES
Subjective:   Franklin Yeh is a 64 year old male who presents for a MA AHA (Medicare Advantage Annual Health Assessment) and Subsequent Annual Wellness visit (Pt already had Initial Annual Wellness) and scheduled follow up of multiple significant but stable problems.   History of Present Illness              The patient is currently feeling well. The patient denies chest pain, SOB, N/V/C/D, fever, dizziness, syncope, and abdominal pain. There are no other concerns today.    History/Other:   Fall Risk Assessment:   He has been screened for Falls and is High Risk. Fall Prevention information provided to patient in After Visit Summary.    Do you feel unsteady when standing or walking?: (Patient-Rptd) Yes  Do you worry about falling?: (Patient-Rptd) Yes  Have you fallen in the past year?: (Patient-Rptd) Yes  How many times have you fallen?: (Patient-Rptd) (P) 0  Were you injured?: (Patient-Rptd) (P) No     Cognitive Assessment:   He had a completely normal cognitive assessment - see flowsheet entries       Functional Ability/Status:   Franklin Yeh has some abnormal functions as listed below:  He has Dressing and/or Bathing issues based on screening of functional status.  Difficulty dressing or bathing?: (Patient-Rptd) Yes  Bathing or Showering: (Patient-Rptd) Need some help  Dressing: (Patient-Rptd) Need some help  He has Meal Preparation difficulties based on screening of functional status.He has difficulties Managing Money/Bills based on screening of functional status.He has difficulties Shopping for Groceries based on screening of functional status. He has Hearing problems based on screening of functional status.He has Vision problems based on screening of functional status. He has Walking problems based on screening of functional status.       Depression Screening (PHQ):  PHQ-2 SCORE: 0  , done 4/17/2025   Last [place] Suicide Screening on 4/22/2025 was No Risk.       Advanced Directives:   He  does have a Living Will but we do NOT have it on file in Epic.    He does have a POA but we do NOT have it on file in [employer].    Patient has Advance Care Planning documents but we do not have a copy in EMR. Discussed Advanced Care Planning with patient and instructed patient to get our office a copy to be scanned into EMR.      Problem List[1]  Allergies:  He is allergic to seasonal.    Current Medications:  Active Meds, Sig Only[2]    Medical History:  He  has a past medical history of Asthma ([facility]).  Surgical History:  He  has a past surgical history that includes appendectomy and colonoscopy (N/A, 8/17/2022).   Family History:  His family history is not on file.  Social History:  He  reports that he has never smoked. He has never been exposed to tobacco smoke. He has never used smokeless tobacco. He reports that he does not currently use alcohol. He reports that he does not use drugs.    Tobacco:       CAGE Alcohol Screen:   CAGE screening score of 0 on 4/17/2025, showing low risk of alcohol abuse.      Patient Care Team:  Joann Love MD as PCP - General (Family Medicine)    Review of Systems  REVIEW OF SYSTEMS:     Review of Systems   Constitutional: Negative.    HENT: Negative.    Eyes: Negative.    Respiratory: Negative.    Cardiovascular: Negative.    Gastrointestinal: Negative.    Genitourinary: Negative.    Musculoskeletal: Negative.    Skin: Negative.    Neurological: Negative.    Psychiatric/Behavioral: Negative.        Objective:   Physical Exam  Physical Exam   Constitutional: Patient is oriented to person, place, and time. Patient appears well-developed and well-nourished.   HENT:   Head: Normocephalic and atraumatic.   Right Ear: External ear normal.   Left Ear: External ear normal.   Nose: Nose normal.   Mouth/Throat: Oropharynx is clear and moist.   Eyes: Pupils are equal, round, and reactive to light. Conjunctivae and EOM are normal.   Neck: Normal range of motion. Neck supple.   Cardiovascular:  Normal rate, regular rhythm, normal heart sounds and intact distal pulses.   Pulmonary/Chest: Effort normal and breath sounds normal.   Abdominal: Soft. Bowel sounds are normal.   Musculoskeletal: Normal range of motion.   Neurological: Patient is alert and oriented to person, place, and time.   Skin: no rashes, no lesions.   Psychiatric: patient has a normal mood and affect. Judgment and thought content normal.   Vitals reviewed.    /80   Pulse 80   Ht 5' 11\" (1.803 m)   Wt 184 lb (83.5 kg)   BMI 25.66 kg/m²  Estimated body mass index is 25.66 kg/m² as calculated from the following:    Height as of this encounter: 5' 11\" (1.803 m).    Weight as of this encounter: 184 lb (83.5 kg).    Medicare Hearing Assessment:   Hearing Screening    Screening Method: Finger Rub  Finger Rub Result: Pass         Visual Acuity:   Right Eye Visual Acuity: Corrected Right Eye Chart Acuity: 20/20   Left Eye Visual Acuity: Corrected Left Eye Chart Acuity: 20/20   Both Eyes Visual Acuity: Corrected Both Eyes Chart Acuity: 20/20   Able To Tolerate Visual Acuity: Yes        Assessment & Plan:   Franklin Yeh is a 64 year old male who presents for a Medicare Assessment.     1. Routine general medical examination at a health care facility (Primary)  -     CBC With Differential With Platelet; Future; Expected date: 04/22/2025  -     Comp Metabolic Panel (14); Future; Expected date: 04/22/2025  -     Lipid Panel; Future; Expected date: 04/22/2025  -     TSH W Reflex To Free T4; Future; Expected date: 04/22/2025  Overall health discussed, exercise/activity appropriate for age and health status, heathy diety, preventive care, and upcoming screening discussed. Routine labs ordered.    2. Mixed hyperlipidemia  -     CBC With Differential With Platelet; Future; Expected date: 04/22/2025  -     Comp Metabolic Panel (14); Future; Expected date: 04/22/2025  -     Lipid Panel; Future; Expected date: 04/22/2025  -     TSH W Reflex To  Free T4; Future; Expected date: 04/22/2025  Stable. Continue current management.  Continue to follow up with Cardiologist.    3. Mild persistent asthma without complication (HCC)  -     Albuterol Sulfate HFA; Inhale 2 puffs into the lungs every 4 (four) hours as needed.  Dispense: 18 g; Refill: 5            The patient indicates understanding of these issues and agrees to the plan.  Lab work ordered.  Patient reassured.  Prescription medication ordered.  Reinforced healthy diet, lifestyle, and exercise.      No follow-ups on file.     Wilberto Acuña PA-C, 4/22/2025     Supplementary Documentation:   General Health:  In the past six months, have you lost more than 10 pounds without trying?: (Patient-Rptd) 2 - No  Has your appetite been poor?: (Patient-Rptd) No  Type of Diet: (Patient-Rptd) Low Salt  How does the patient maintain a good energy level?: (Patient-Rptd) Daily Walks  How would you describe your daily physical activity?: (Patient-Rptd) Moderate  How would you describe your current health state?: (Patient-Rptd) Good  How do you maintain positive mental well-being?: (Patient-Rptd) Visiting Friends  On a scale of 0 to 10, with 0 being no pain and 10 being severe pain, what is your pain level?: (Patient-Rptd) 6 - (Moderate)  In the past six months, have you experienced urine leakage?: (Patient-Rptd) 0-No  At any time do you feel concerned for the safety/well-being of yourself and/or your children, in your home or elsewhere?: (Patient-Rptd) No  Have you had any immunizations at another office such as Influenza, Hepatitis B, Tetanus, or Pneumococcal?: (Patient-Rptd) Yes    Health Maintenance   Topic Date Due    Asthma Control Test  Never done    DTaP,Tdap,and Td Vaccines (1 - Tdap) Never done    Zoster Vaccines (1 of 2) Never done    COVID-19 Vaccine (1 - 2024-25 season) Never done    Annual Well Visit  01/01/2025    Annual Depression Screening  01/01/2025    Influenza Vaccine (Season Ended) 10/01/2025    PSA   10/16/2026    Colorectal Cancer Screening  08/17/2027    Pneumococcal Vaccine: 50+ Years  Completed    Meningococcal B Vaccine  Aged Out            [1]   Patient Active Problem List  Diagnosis    Sensorineural hearing loss, bilateral    Tremor    Mild persistent asthma without complication (HCC)   [2]   Outpatient Medications Marked as Taking for the 4/22/25 encounter (Office Visit) with Wilberto Acuña PA-C   Medication Sig    BREO ELLIPTA 200-25 MCG/ACT Inhalation Aerosol Powder, Breath Activated     primidone 50 MG Oral Tab Take 1 tablet (50 mg total) by mouth nightly.    aspirin 81 MG Oral Tab EC Take 1 tablet (81 mg total) by mouth daily.    albuterol 108 (90 Base) MCG/ACT Inhalation Aero Soln Inhale 2 puffs into the lungs every 4 (four) hours as needed.

## 2025-04-26 ENCOUNTER — LAB ENCOUNTER (OUTPATIENT)
Dept: LAB | Age: 65
End: 2025-04-26
Attending: PHYSICIAN ASSISTANT
Payer: MEDICARE

## 2025-04-26 DIAGNOSIS — E78.2 MIXED HYPERLIPIDEMIA: ICD-10-CM

## 2025-04-26 DIAGNOSIS — R73.09 ABNORMAL GLUCOSE: ICD-10-CM

## 2025-04-26 DIAGNOSIS — Z00.00 ROUTINE GENERAL MEDICAL EXAMINATION AT A HEALTH CARE FACILITY: ICD-10-CM

## 2025-04-26 LAB
ALBUMIN SERPL-MCNC: 4.6 G/DL (ref 3.2–4.8)
ALBUMIN/GLOB SERPL: 1.8 {RATIO} (ref 1–2)
ALP LIVER SERPL-CCNC: 79 U/L (ref 45–117)
ALT SERPL-CCNC: 24 U/L (ref 10–49)
ANION GAP SERPL CALC-SCNC: 8 MMOL/L (ref 0–18)
AST SERPL-CCNC: 68 U/L (ref ?–34)
BASOPHILS # BLD AUTO: 0.08 X10(3) UL (ref 0–0.2)
BASOPHILS NFR BLD AUTO: 1.1 %
BILIRUB SERPL-MCNC: 1 MG/DL (ref 0.2–1.1)
BUN BLD-MCNC: 23 MG/DL (ref 9–23)
BUN/CREAT SERPL: 24.2 (ref 10–20)
CALCIUM BLD-MCNC: 9.1 MG/DL (ref 8.7–10.4)
CHLORIDE SERPL-SCNC: 106 MMOL/L (ref 98–112)
CHOLEST SERPL-MCNC: 143 MG/DL (ref ?–200)
CO2 SERPL-SCNC: 28 MMOL/L (ref 21–32)
CREAT BLD-MCNC: 0.95 MG/DL (ref 0.7–1.3)
DEPRECATED RDW RBC AUTO: 43.3 FL (ref 35.1–46.3)
EGFRCR SERPLBLD CKD-EPI 2021: 89 ML/MIN/1.73M2 (ref 60–?)
EOSINOPHIL # BLD AUTO: 0.62 X10(3) UL (ref 0–0.7)
EOSINOPHIL NFR BLD AUTO: 8.4 %
ERYTHROCYTE [DISTWIDTH] IN BLOOD BY AUTOMATED COUNT: 12.6 % (ref 11–15)
FASTING PATIENT LIPID ANSWER: YES
FASTING STATUS PATIENT QL REPORTED: YES
GLOBULIN PLAS-MCNC: 2.5 G/DL (ref 2–3.5)
GLUCOSE BLD-MCNC: 103 MG/DL (ref 70–99)
HCT VFR BLD AUTO: 42.9 % (ref 39–53)
HDLC SERPL-MCNC: 54 MG/DL (ref 40–59)
HGB BLD-MCNC: 14.5 G/DL (ref 13–17.5)
IMM GRANULOCYTES # BLD AUTO: 0.01 X10(3) UL (ref 0–1)
IMM GRANULOCYTES NFR BLD: 0.1 %
LDLC SERPL CALC-MCNC: 77 MG/DL (ref ?–100)
LYMPHOCYTES # BLD AUTO: 2.05 X10(3) UL (ref 1–4)
LYMPHOCYTES NFR BLD AUTO: 27.9 %
MCH RBC QN AUTO: 31.8 PG (ref 26–34)
MCHC RBC AUTO-ENTMCNC: 33.8 G/DL (ref 31–37)
MCV RBC AUTO: 94.1 FL (ref 80–100)
MONOCYTES # BLD AUTO: 0.61 X10(3) UL (ref 0.1–1)
MONOCYTES NFR BLD AUTO: 8.3 %
NEUTROPHILS # BLD AUTO: 3.98 X10 (3) UL (ref 1.5–7.7)
NEUTROPHILS # BLD AUTO: 3.98 X10(3) UL (ref 1.5–7.7)
NEUTROPHILS NFR BLD AUTO: 54.2 %
NONHDLC SERPL-MCNC: 89 MG/DL (ref ?–130)
OSMOLALITY SERPL CALC.SUM OF ELEC: 298 MOSM/KG (ref 275–295)
PLATELET # BLD AUTO: 250 10(3)UL (ref 150–450)
POTASSIUM SERPL-SCNC: 4.6 MMOL/L (ref 3.5–5.1)
PROT SERPL-MCNC: 7.1 G/DL (ref 5.7–8.2)
RBC # BLD AUTO: 4.56 X10(6)UL (ref 4.3–5.7)
SODIUM SERPL-SCNC: 142 MMOL/L (ref 136–145)
TRIGL SERPL-MCNC: 57 MG/DL (ref 30–149)
TSI SER-ACNC: 0.75 UIU/ML (ref 0.55–4.78)
VLDLC SERPL CALC-MCNC: 9 MG/DL (ref 0–30)
WBC # BLD AUTO: 7.4 X10(3) UL (ref 4–11)

## 2025-04-26 PROCEDURE — 80053 COMPREHEN METABOLIC PANEL: CPT

## 2025-04-26 PROCEDURE — 84443 ASSAY THYROID STIM HORMONE: CPT

## 2025-04-26 PROCEDURE — 83036 HEMOGLOBIN GLYCOSYLATED A1C: CPT

## 2025-04-26 PROCEDURE — 85025 COMPLETE CBC W/AUTO DIFF WBC: CPT

## 2025-04-26 PROCEDURE — 80061 LIPID PANEL: CPT

## 2025-04-26 PROCEDURE — 36415 COLL VENOUS BLD VENIPUNCTURE: CPT

## 2025-05-02 ENCOUNTER — TELEPHONE (OUTPATIENT)
Dept: FAMILY MEDICINE CLINIC | Facility: CLINIC | Age: 65
End: 2025-05-02

## 2025-05-02 DIAGNOSIS — J45.909 UNCOMPLICATED ASTHMA, UNSPECIFIED ASTHMA SEVERITY, UNSPECIFIED WHETHER PERSISTENT (HCC): Primary | ICD-10-CM

## 2025-05-02 DIAGNOSIS — J45.30 MILD PERSISTENT ASTHMA WITHOUT COMPLICATION (HCC): ICD-10-CM

## 2025-05-02 NOTE — TELEPHONE ENCOUNTER
Per patient he needs a referral to go to Dr Christina Glover Tel# [phone] Pulmonary doctor for patient Lungs and Asthma  and patient has an appointment on Monday 05/05/2025.

## 2025-05-02 NOTE — TELEPHONE ENCOUNTER
Dr Love,     Patient called requesting referral to Dr. Glover.     Pended referral please review diagnosis and sign off if you agree.    Thank you.  Arline Charlton  [facility]

## 2025-05-05 ENCOUNTER — TELEPHONE (OUTPATIENT)
Dept: CASE MANAGEMENT | Age: 65
End: 2025-05-05

## 2025-05-05 ENCOUNTER — OFFICE VISIT (OUTPATIENT)
Facility: CLINIC | Age: 65
End: 2025-05-05
Payer: COMMERCIAL

## 2025-05-05 VITALS
OXYGEN SATURATION: 96 % | HEART RATE: 76 BPM | WEIGHT: 184 LBS | DIASTOLIC BLOOD PRESSURE: 64 MMHG | HEIGHT: 71 IN | BODY MASS INDEX: 25.76 KG/M2 | RESPIRATION RATE: 16 BRPM | SYSTOLIC BLOOD PRESSURE: 122 MMHG

## 2025-05-05 DIAGNOSIS — J45.50 SEVERE PERSISTENT ASTHMA WITHOUT COMPLICATION (HCC): Primary | ICD-10-CM

## 2025-05-05 DIAGNOSIS — J45.30 MILD PERSISTENT ASTHMA WITHOUT COMPLICATION (HCC): Primary | ICD-10-CM

## 2025-05-05 RX ORDER — BUDESONIDE 0.5 MG/2ML
0.5 INHALANT ORAL DAILY
Qty: 120 ML | Refills: 5 | Status: SHIPPED | OUTPATIENT
Start: 2025-05-05

## 2025-05-05 NOTE — PROGRESS NOTES
[facility] General Pulmonary Consult Note    Chief Complaint:  Chief Complaint   Patient presents with    New Patient     Establishing care Hx asthma         History of Present Illness:  History of Present Illness  Franklin Yeh is a 64 year old male with asthma who presents for a pulmonary consultation.    Asthma was diagnosed in 2018. He generally experiences good control of his breathing with the use of Breo 200 daily, which he finds effective. He does not regularly use an inhaler. In February 2025, while in [place], he was prescribed prednisone for five days, which resolved his symptoms. His breathing issues are intermittent.    He is a  and is exposed to chemicals and dust in his work environment, but he uses masks to mitigate exposure. He does not smoke and has not been hospitalized for breathing issues. He has been around smokers but does not smoke himself.    He experiences seasonal allergies and has been diagnosed with sleep apnea, with his wife noting excessive snoring. He uses a nebulizer with medication twice a week, which he believes helps his breathing.      Past Medical History:   Past Medical History[1]     Past Surgical History: Past Surgical History[2]    Family Medical History: Family History[3]     Social History:   Social History     Socioeconomic History    Marital status:      Spouse name: Not on file    Number of children: Not on file    Years of education: Not on file    Highest education level: Not on file   Occupational History    Not on file   Tobacco Use    Smoking status: Never     Passive exposure: Never    Smokeless tobacco: Never   Vaping Use    Vaping status: Not on file   Substance and Sexual Activity    Alcohol use: Yes     Alcohol/week: 1.0 standard drink of alcohol     Types: 1 Glasses of wine per week    Drug use: Never    Sexual activity: Not on file   Other Topics Concern    Not on file   Social History Narrative    Not on file     Social Drivers of Health      Food Insecurity: Not on file   Transportation Needs: Not on file   Stress: Not on file   Housing Stability: Not on file        Allergies: Seasonal     Medications: Current Medications[4]    Review of Systems: Review of Systems    Physical Exam:  /64 (BP Location: Right arm, Patient Position: Sitting, Cuff Size: adult)   Pulse 76   Resp 16   Ht 5' 11\" (1.803 m)   Wt 184 lb (83.5 kg)   SpO2 96%   BMI 25.66 kg/m²      Constitutional: alert, cooperative. No acute distress.  HEENT: Head NC/AT. Nares normal. Septum midline. Mucosa normal. No drainage or sinus tenderness.. Mallampati 2+  Cardio: Regular rate and rhythm. Normal S1 and S2. No murmurs.   Respiratory: Thorax symmetrical with no labored breathing. clear to auscultation bilaterally  GI: NABS. Abd soft, non-tender.  Extremities: No clubbing or cyanosis. No BLE edema.    Neurologic: A&Ox3. No gross motor deficits.  Skin: Warm, dry  Psych: Calm, cooperative. Pleasant affect.    Results:  Images personally reviewed - my own review dictated as below  Results  LABS  CBC: Eosinophils 620 (04/26/2025)  CBC: Eosinophils 720 (12/25/2024)  CBC: Eosinophils 350 (10/2024)  WBC: 7.4, done on 4/26/2025.  HGB: 14.5, done on 4/26/2025.  PLT: 250, done on 4/26/2025.     Glucose: 103, done on 4/26/2025.  Cr: 0.95, done on 4/26/2025.  Last eGFR was 89 on 4/26/2025.  CA: 9.1, done on 4/26/2025.  Na: 142, done on 4/26/2025.  K: 4.6, done on 4/26/2025.  Cl: 106, done on 4/26/2025.  CO2: 28, done on 4/26/2025.  Last ALB was 4.6% done on 4/26/2025.     No results found.     Assessment/Plan:  Assessment & Plan  Asthma  Asthma well-controlled with Breo 200. Elevated eosinophils suggest potential for future exacerbations. Discussed nebulizer with budesonide as a precautionary measure. Explained budesonide's fewer systemic side effects compared to prednisone. Discussed long-term risks of prednisone.  - Continue Breo 200 daily.  - Provide nebulizer with budesonide for use if  symptoms worsen.  - If has  another exacerbation would consider biologics    Elevated eosinophils  Elevated eosinophil levels (620, 720) indicate potential risk for asthma exacerbations. Eosinophil count is a marker for asthma severity.    Seasonal allergies  Reports of seasonal allergies. No recent exacerbations or hospitalizations.    Sleep apnea  Known diagnosis of sleep apnea. Reports of snoring by spouse. No recent changes in symptoms or treatment discussed.        Return in about 6 months (around 11/5/2025).    Christina Glover MD  5/5/2025         [1]   Past Medical History:   Allergic rhinitis    Seasonal allergies    Asthma (HCC)    Calculus of kidney    Hemorrhoids    History of blood clots    Hyperlipidemia    Osteoarthritis    Sleep apnea    I use a mask, but I can handle it. I told the doctor to give me a surgery. Insurance did not want to have a surgery.   [2]   Past Surgical History:  Procedure Laterality Date    Appendectomy      Colonoscopy N/A 08/17/2022    Procedure: COLONOSCOPY;  Surgeon: Augustin Myles MD;  Location: [facility] ENDOSCOPY    Other surgical history  8/15/2008 and 7/26/2024    Feet and hand   [3]   Family History  Problem Relation Age of Onset    Heart Disorder Mother     Cancer Father     Diabetes Father     Heart Disorder Father     Hypertension Father     Cancer Sister         Breast cancer    Diabetes Sister     Hypertension Sister     Cancer Daughter         [place] cancer    Cancer Sister         Breast cancer    Diabetes Sister     Hypertension Sister     Cancer Daughter         [place] cancer    Cancer Daughter         [place] cancer    Diabetes Sister     Hypertension Sister     Cancer Sister         Breast cancer    Diabetes Sister     Hypertension Sister     Cancer Daughter         [place] cancer   [4]   Current Outpatient Medications   Medication Sig Dispense Refill    budesonide 0.5 MG/2ML Inhalation Suspension Take 2 mL (0.5 mg total) by nebulization  daily. 120 mL 5    Respiratory Therapy Supplies (FULL KIT NEBULIZER SET) Does not apply Misc 1 kit As Directed. 1 each 0    BREO ELLIPTA 200-25 MCG/ACT Inhalation Aerosol Powder, Breath Activated       rosuvastatin 10 MG Oral Tab Take 1 tablet (10 mg total) by mouth daily.      primidone 50 MG Oral Tab Take 1 tablet (50 mg total) by mouth nightly.      aspirin 81 MG Oral Tab EC Take 1 tablet (81 mg total) by mouth daily.      montelukast 10 MG Oral Tab Take 1 tablet (10 mg total) by mouth daily. 90 tablet 3    albuterol 108 (90 Base) MCG/ACT Inhalation Aero Soln Inhale 2 puffs into the lungs every 4 (four) hours as needed. (Patient not taking: Reported on 5/5/2025) 18 g 5

## 2025-05-05 NOTE — TELEPHONE ENCOUNTER
Wilberto Acuña,     Patient called requesting referral to Dr. Glover.    Pended referral please review diagnosis and sign off if you agree.    Thank you.  Arline Charlton  Managed Care

## 2025-05-05 NOTE — TELEPHONE ENCOUNTER
Per Fabiola from [facility] @ [phone] need the referral as soon as possible.please fax it to [phone].

## 2025-05-05 NOTE — PROGRESS NOTES
The following individual(s) verbally consented to be recorded using ambient AI listening technology and understand that they can each withdraw their consent to this listening technology at any point by asking the clinician to turn off or pause the recording:    Patient name: Franklin Yeh  Additional names:

## 2025-05-06 ENCOUNTER — TELEPHONE (OUTPATIENT)
Dept: FAMILY MEDICINE CLINIC | Facility: CLINIC | Age: 65
End: 2025-05-06

## 2025-05-06 DIAGNOSIS — Z78.9 PATIENT TRAVELS: Primary | ICD-10-CM

## 2025-05-07 LAB — SARS-COV-2 RNA RESP QL NAA+PROBE: NOT DETECTED

## 2025-06-09 ENCOUNTER — TELEPHONE (OUTPATIENT)
Dept: CASE MANAGEMENT | Age: 65
End: 2025-06-09

## 2025-06-09 ENCOUNTER — TELEPHONE (OUTPATIENT)
Dept: FAMILY MEDICINE CLINIC | Facility: CLINIC | Age: 65
End: 2025-06-09

## 2025-06-09 DIAGNOSIS — M79.672 PAIN IN BOTH FEET: Primary | ICD-10-CM

## 2025-06-09 DIAGNOSIS — M79.671 PAIN IN BOTH FEET: Primary | ICD-10-CM

## 2025-06-09 DIAGNOSIS — I24.0 BLOCKAGE OF CORONARY ARTERY OF HEART (HCC): Primary | ICD-10-CM

## 2025-06-09 NOTE — TELEPHONE ENCOUNTER
Per patient he needs a referral to go and see Dr Rip Johnston Tel# [phone] patient is going to see the doctor for his left and right feet in pain.  Patient will call back for doctor's fax#. No appointment yet.

## 2025-06-09 NOTE — TELEPHONE ENCOUNTER
Wilberto Acuña,     Patient called requesting referral to Dr. Alexei Barton, patient states this is a cardiologist and this is for a heart blockage.     Patient has follow up appointment 6/10/25.     Pended referral please review diagnosis and sign off if you agree.    Thank you.  Arline Charlton  [facility]

## 2025-06-09 NOTE — TELEPHONE ENCOUNTER
Wilberto Acuña,    Patient called requesting referral to Dr. Rip Johnston.     Pended referral please review diagnosis and sign off if you agree.    Thank you.  Arline Charlton  Managed Care

## 2025-06-10 ENCOUNTER — APPOINTMENT (OUTPATIENT)
Dept: CARDIOLOGY | Age: 65
End: 2025-06-10

## 2025-06-10 VITALS
BODY MASS INDEX: 25.06 KG/M2 | TEMPERATURE: 97.8 F | HEIGHT: 71 IN | DIASTOLIC BLOOD PRESSURE: 73 MMHG | WEIGHT: 179 LBS | SYSTOLIC BLOOD PRESSURE: 145 MMHG | OXYGEN SATURATION: 96 % | HEART RATE: 64 BPM

## 2025-06-10 DIAGNOSIS — E78.00 PURE HYPERCHOLESTEROLEMIA: Primary | ICD-10-CM

## 2025-06-10 DIAGNOSIS — I10 PRIMARY HYPERTENSION: ICD-10-CM

## 2025-06-10 RX ORDER — MULTIVIT WITH MINERALS/LUTEIN
250 TABLET ORAL DAILY
COMMUNITY

## 2025-06-10 RX ORDER — ALBUTEROL SULFATE 90 UG/1
2 INHALANT RESPIRATORY (INHALATION) EVERY 4 HOURS PRN
COMMUNITY
Start: 2025-04-22

## 2025-06-10 RX ORDER — PRIMIDONE 50 MG/1
25 TABLET ORAL NIGHTLY
COMMUNITY
Start: 2025-01-28

## 2025-06-10 ASSESSMENT — ENCOUNTER SYMPTOMS
EYES NEGATIVE: 1
GASTROINTESTINAL NEGATIVE: 1
RESPIRATORY NEGATIVE: 1
SYNCOPE: 0
ORTHOPNEA: 0
HEMATOLOGIC/LYMPHATIC NEGATIVE: 1
PSYCHIATRIC NEGATIVE: 1
NEUROLOGICAL NEGATIVE: 1
NEAR-SYNCOPE: 0
ENDOCRINE NEGATIVE: 1
CONSTITUTIONAL NEGATIVE: 1
ALLERGIC/IMMUNOLOGIC NEGATIVE: 1

## 2025-06-11 DIAGNOSIS — I10 PRIMARY HYPERTENSION: ICD-10-CM

## 2025-06-11 DIAGNOSIS — E78.00 PURE HYPERCHOLESTEROLEMIA: Primary | ICD-10-CM

## 2025-07-17 ENCOUNTER — TELEPHONE (OUTPATIENT)
Dept: FAMILY MEDICINE CLINIC | Facility: CLINIC | Age: 65
End: 2025-07-17

## 2025-07-17 NOTE — TELEPHONE ENCOUNTER
Unable to reach patient for medication adherence consult. LVM for patient to call me back at [phone].

## 2025-08-13 ENCOUNTER — TELEPHONE (OUTPATIENT)
Dept: CASE MANAGEMENT | Age: 65
End: 2025-08-13

## 2025-08-13 DIAGNOSIS — M25.562 PAIN IN BOTH KNEES, UNSPECIFIED CHRONICITY: Primary | ICD-10-CM

## 2025-08-13 DIAGNOSIS — M25.561 PAIN IN BOTH KNEES, UNSPECIFIED CHRONICITY: Primary | ICD-10-CM

## 2025-08-21 ENCOUNTER — OFFICE VISIT (OUTPATIENT)
Dept: FAMILY MEDICINE CLINIC | Facility: CLINIC | Age: 65
End: 2025-08-21

## 2025-08-21 ENCOUNTER — HOSPITAL ENCOUNTER (OUTPATIENT)
Dept: GENERAL RADIOLOGY | Age: 65
Discharge: HOME | End: 2025-08-21
Attending: PHYSICIAN ASSISTANT

## 2025-08-21 VITALS
BODY MASS INDEX: 25.06 KG/M2 | HEART RATE: 66 BPM | WEIGHT: 179 LBS | SYSTOLIC BLOOD PRESSURE: 130 MMHG | HEIGHT: 71 IN | DIASTOLIC BLOOD PRESSURE: 72 MMHG

## 2025-08-21 DIAGNOSIS — L03.039 PARONYCHIA OF GREAT TOE: ICD-10-CM

## 2025-08-21 DIAGNOSIS — M25.561 ACUTE PAIN OF RIGHT KNEE: Primary | ICD-10-CM

## 2025-08-21 DIAGNOSIS — M25.561 ACUTE PAIN OF RIGHT KNEE: ICD-10-CM

## 2025-08-21 DIAGNOSIS — J45.30 MILD PERSISTENT ASTHMA WITHOUT COMPLICATION (HCC): ICD-10-CM

## 2025-08-21 DIAGNOSIS — M79.674 GREAT TOE PAIN, RIGHT: ICD-10-CM

## 2025-08-21 PROCEDURE — 3078F DIAST BP <80 MM HG: CPT | Performed by: PHYSICIAN ASSISTANT

## 2025-08-21 PROCEDURE — 3075F SYST BP GE 130 - 139MM HG: CPT | Performed by: PHYSICIAN ASSISTANT

## 2025-08-21 PROCEDURE — 3008F BODY MASS INDEX DOCD: CPT | Performed by: PHYSICIAN ASSISTANT

## 2025-08-21 PROCEDURE — 99213 OFFICE O/P EST LOW 20 MIN: CPT | Performed by: PHYSICIAN ASSISTANT

## 2025-08-21 PROCEDURE — 73630 X-RAY EXAM OF FOOT: CPT | Performed by: PHYSICIAN ASSISTANT

## 2025-08-21 PROCEDURE — 73562 X-RAY EXAM OF KNEE 3: CPT | Performed by: PHYSICIAN ASSISTANT

## 2025-08-21 RX ORDER — ALBUTEROL SULFATE 90 UG/1
2 INHALANT RESPIRATORY (INHALATION) EVERY 4 HOURS PRN
Qty: 18 G | Refills: 5 | Status: SHIPPED | OUTPATIENT
Start: 2025-08-21

## 2025-08-21 RX ORDER — CEPHALEXIN 500 MG/1
500 CAPSULE ORAL 3 TIMES DAILY
Qty: 21 CAPSULE | Refills: 0 | Status: SHIPPED | OUTPATIENT
Start: 2025-08-21 | End: 2025-08-28

## 2026-05-05 ENCOUNTER — APPOINTMENT (OUTPATIENT)
Dept: CARDIOLOGY | Age: 66
End: 2026-05-05

## (undated) DIAGNOSIS — Z12.11 SCREENING FOR COLON CANCER: Primary | ICD-10-CM

## (undated) DEVICE — SNARE OPTMZ PLPCTM TRP

## (undated) DEVICE — MEDI-VAC NON-CONDUCTIVE SUCTION TUBING 6MM X 1.8M (6FT.) L: Brand: CARDINAL HEALTH

## (undated) DEVICE — KIT CLEAN ENDOKIT 1.1OZ GOWNX2

## (undated) DEVICE — 35 ML SYRINGE REGULAR TIP: Brand: MONOJECT

## (undated) DEVICE — SNARE CAPTIFLEX MICRO-OVL OLY

## (undated) DEVICE — LINE MNTR ADLT SET O2 INTMD

## (undated) DEVICE — KIT ENDO ORCAPOD 160/180/190

## (undated) NOTE — LETTER
Patient Name: Franklin Yeh  YOB: 1960          MRN number:  [id number]  Date:  1/3/2025  Referring Physician:  Agustín Colón         OCCUPATIONAL THERAPY UPPER EXTREMITY EVALUATION     Diagnosis:   Primary osteoarthritis of first carpometacarpal joint of left hand (M18.12)       Referring Provider: Agustín Colón  Date of Evaluation:    1/2/2025    Precautions:   Following [place] Protocols for CTR and CMC arthroplasty Next MD visit: 01/08/25  Date of Surgery: 10/31/24     Doctor's Instructions: At 6 weeks post-op, patient may begin therapy for all fingers, wrist, and thumb AAROM and AROM with OT. Pinch retraining at 6 weeks- (emphasize CMC abduction, radial extension, and opposition to each finger) Continue orthosis after exercise and at night.    PATIENT SUMMARY   Franklin Yeh is a 64 year old male who presents to therapy today s/p surgical intervention to the Left hand.   Pt was having arthrtic pain prior to surgery. D/t pain, pt and doctor decided on L CMC arthroplasty and carpal tunnel release. Prior to this surgery, pt had the same surgery performed on the R side. Pt is 9 weeks post-op. Pt is just now starting occupational therapy here because of recent vacationing in [place]. Pt did undergo an evaluation in [place] in which he was provided with AROM exercises.  Pt describes pain level current 3/10, at best 2/10, at worst 5/10.   Current functional limitations include inability to lift heavy, fastening buttons, and pinching items.    Franklin describes prior level of function independent adl and iadl.   Employment: Retired  Hand Dominance: right  Living Situation: w/ spouse  Imaging/Tests: none available of the left hand  Pt goals include return to full ability including lifting and gripping.  Past medical history was reviewed with Franklin. Significant findings include   Past Medical History:    Asthma (HCC)         ASSESSMENT  Franklin presents to occupational therapy  evaluation s/p Left CTR and CMC arthroplasty on 10/31/24. The results of the objective tests and measures show deficits in L wrist AROM, thumb AROM, fine motor control, and L hand strength.  Functional deficits include but are not limited to inability to lift heavy, fastening buttons, and pinching items.  Signs and symptoms are consistent with diagnosis of orthopedic aftercare s/p CTR and CMC arthroplasty of the left hand s/p Primary osteoarthritis of first carpometacarpal joint of left hand. Pt and OT discussed evaluation findings, pathology, POC and HEP.  Pt voiced understanding and performs HEP correctly without reported pain. Skilled Occupational Therapy is medically necessary to address the above impairments and reach functional goals.     OBJECTIVE    OBSERVATION: Visible incision site over the dorsum of the Left thumb and mild edema present over CT    ORTHOTICS: prefabricated thumb spica    SCAR: Adhered Min     SENSORY: WNL    ROM: (* denotes performed with pain)  Shoulder  Elbow Wrist   WNL WNL Flexion: R 45, L 35  Extension: R 50, L 60  Ulnar Deviation: R 35, L 30  Radial Deviation R 15, L 10     AROM/PROM:(Degrees)  RIGHT HAND:    Thumb   MP 0/50       IP 0/74   POTTS 124   Thumb radial abduction: 55 degrees  Thumb Palmar abduction: 50 degrees    LEFT HAND:    Thumb   MP 0/50       IP 0/53   POTTS 103   Thumb radial abduction: 45 degrees  Thumb Palmar abduction: 35 degrees      Strength (lbs) Right Average Left Average   : 52 45   2 pt Pinch: 16 10   3 pt Pinch: 20 14   Lateral Pinch: 18 17     Coordination:   9 Hole Peg Test:           Right Hand: 29 seconds          Left Hand: 27 seconds     Today’s Treatment and Response:   Pt education was provided on exam findings, treatment diagnosis, treatment plan, expectations, and prognosis. Pt was also provided recommendations for continuation of orthosis as prescribed by doctor   Patient was instructed in and issued a HEP for: Red putty exercises-lateral  pinch, thumb extension, thumb palmar abduction, and opposition     Charges: OT Eval: Low Complexity, TE 2      Total Timed Treatment: 25 min     Total Treatment Time: 45 min     Based on clinical rationale and outcome measures, this evaluation involved Low Complexity decision making due to 1-2 personal factors/comorbidities, 3 body structures involved/activity limitations, and evolving symptoms including  decline in functional independence .  PLAN OF CARE   Goals: (to be met in 8 visits)  Pt will be independent and compliant with comprehensive HEP to maintain progress achieved in OT  Pt will increase their (L) thumb radial abduction to at least 55 degrees for ease of typing.  Pt will increase their (L) thumb palmar abduction to at least 45 degrees for ease holding his steering wheel.  Pt will increase their (L) thumb POTTS by 15-20 degrees for ease of medication management.  Pt will increase their (L) wrist flexion to at least 50 degrees for ease of dressing.  Pt will increase their (L)  strength by 8-10# for ease of carrying groceries  Pt will decrease their QuickDASH score by 10% in order to demo improvements in functional independence.     Frequency / Duration: Patient will be seen for  1-2x/week or a total of 8 visits over a 90 day period.  Treatment will include: Manual Therapy, Soft tissue mobilization, AROM, PROM, Strengthening, Therapeutic Activity, Moist heat, cryotherapy, Ultrasound, Whirlpool (fluidotherapy), Paraffin, Electrical Stim, Orthosis,  Neuromuscular Re-education, Patient education, Home exercise program,        Education or treatment limitation: None  Rehab Potential:excellent    QuickDASH Outcome Score  Score: (Patient-Rptd) 38.64 % (12/26/2024  9:25 AM)      Patient/Family/Caregiver was advised of these findings, precautions, and treatment options and has agreed to actively participate in planning and for this course of care.    Thank you for your referral. Please co-sign or sign and return  this letter via fax as soon as possible to [phone]. If you have any questions, please contact me at Dept: [phone]    Sincerely,  Electronically signed by therapist: Eda Figueredo, OT  Physician's certification required: Yes  I certify the need for these services furnished under this plan of treatment and while under my care.    X___________________________________________________ Date____________________    Certification From: 1/2/2025  To:4/2/2025 21st Century Cures Act Notice to Patient: Medical documents like this are made available to patients in the interest of transparency. However, be advised this is a medical document and it is intended as [id number] communication between your medical providers. This medical document may contain abbreviations, assessments, medical data, and results or other terms that are unfamiliar. Medical documents are intended to carry relevant information, facts as evident, and the clinical opinion of the practitioner. As such, this medical document may be written in language that appears blunt or direct. You are encouraged to contact your medical provider and/or [facility] Experience if you have any questions about this medical document.

## (undated) NOTE — LETTER
[address] [place] [address]      Authorization for Surgical Operation and Procedure     Date:___________                                                                                                         Time:__________  1. I hereby Shiv Romo MD, my physician and his/her assistants (if applicable), which may include medical students, residents, and/or fellows, to perform the following surgical operation/ procedure and administer such anesthesia as may be determined necessary by my physician:  Operation/Procedure name (s) COLONOSCOPY  on Franklin Yeh   2. I recognize that during the surgical operation/procedure, unforeseen conditions may necessitate additional or different procedures than those listed above. I, therefore, further authorize and request that the above-named surgeon, assistants, or designees perform such procedures as are, in their judgment, necessary and desirable. 3.   My surgeon/physician has discussed prior to my surgery the potential benefits, risks and side effects of this procedure; the likelihood of achieving goals; and potential problems that might occur during recuperation. They also discussed reasonable alternatives to the procedure, including risks, benefits, and side effects related to the alternatives and risks related to not receiving this procedure. I have had all my questions answered and I acknowledge that no guarantee has been made as to the result that may be obtained. 4.   Should the need arise during my operation or immediate post-operative period, I also consent to the administration of blood and/or blood products. Further, I understand that despite careful testing and screening of blood or blood products by collecting agencies, I may still be subject to ill effects as a result of receiving a blood transfusion and/or blood products.   The following are some, but not all, of the potential risks that can occur: fever and allergic reactions, hemolytic reactions, transmission of diseases such as Hepatitis, AIDS and Cytomegalovirus (CMV) and fluid overload. In the event that I wish to have an autologous transfusion of my own blood, or a directed donor transfusion. I will discuss this with my physician. 5.   I authorize the use of any specimen, organs, tissues, body parts or foreign objects that may be removed from my body during the operation/procedure for diagnosis, research or teaching purposes and their subsequent disposal by hospital authorities. I also authorize the release of specimen test results and/or written reports to my treating physician on the hospital medical staff or other referring or consulting physicians involved in my care, at the discretion of the Pathologist or my treating physician. 6.   I consent to the photographing or videotaping of the operations or procedures to be performed, including appropriate portions of my body for medical, scientific, or educational purposes, provided my identity is not revealed by the pictures or by descriptive texts accompanying them. If the procedure has been photographed/videotaped, the surgeon will obtain the original picture, image, videotape or CD. The hospital will not be responsible for storage, release or maintenance of the picture, image, tape or CD.    7.   I consent to the presence of a  or observers in the operating room as deemed necessary by my physician or their designees. 8.   I recognize that in the event my procedure results in extended X-Ray/fluoroscopy time, I may develop a skin reaction. 9. If I have a Do Not Attempt Resuscitation (DNAR) order in place, that status will be suspended while in the operating room, procedural suite, and during the recovery period unless otherwise explicitly stated by me (or a person authorized to consent on my behalf).  The surgeon or my attending physician will determine when the applicable recovery period ends for purposes of reinstating the DNAR order. 10. Patients having a sterilization procedure: I understand that if the procedure is successful the results will be permanent and it will therefore be impossible for me to inseminate, conceive, or bear children. I also understand that the procedure is intended to result in sterility, although the result has not been guaranteed. 11. I acknowledge that my physician has explained sedation/analgesia administration to me including the risk and benefits I consent to the administration of sedation/analgesia as may be necessary or desirable in the judgment of my physician. I CERTIFY THAT I HAVE READ AND FULLY UNDERSTAND THE ABOVE CONSENT TO OPERATION and/or OTHER PROCEDURE.    _________________________________________  __________________________________  Signature of Patient     Signature of Responsible Person         ___________________________________         Printed Name of Responsible Person           _________________________________                  Relationship to Patient  _________________________________________  ______________________________  Signature of Witness          Date  Time    STATEMENT OF PHYSICIAN My signature below affirms that prior to the time of the procedure; I have explained to the patient and/or his/her legal representative, the risks and benefits involved in the proposed treatment and any reasonable alternative to the proposed treatment. I have also explained the risks and benefits involved in refusal of the proposed treatment and alternatives to the proposed treatment and have answered the patient's questions. If I have a significant financial interest in a co-management agreement or a significant financial interest in any product or implant, or other significant relationship used in this procedure/surgery, I have disclosed this and had a discussion with my patient. _______________________________________________________________ _____________________________  Parvez Orf of Physician)                                                                                         (Date)                                   (Time)        Patient Name: Sachi Henderson    : 5/15/1960   Printed: 2022      Medical Record #: [id number]                                              Page 1 of 1

## (undated) NOTE — LETTER
Patient Name: Franklin Yeh  : 5/15/1960  MRN: [id number]  Patient Address: [address] [place] [address]      COVID-19 Guidelines 2024      [facility] is committed to the safety and well-being of our patients, visitors, employees, and communities. Please review this entire document. It includes information related to pending tests, positive results and aftercare.    Patients with pending COVID-19 test results should follow all care and home isolation instructions. An [facility] representative will call with your test results. Results will also be available on Shyp.    Home Isolation    If you have tested positive for COVID-19 OR if you are sick and suspect that you have COVID-19 but do not yet have test results, you should remain under home isolation and follow the below guidelines:    Step 1: Stay home and away from others until at least 24 hours after both:  Your symptoms are getting better overall, and  You have not had a fever (and are not using fever-reducing medication)    Step 2: Resume normal activities, and use added prevention strategies over the next five days. Clean your hands often, wear a well-fitting mask when around others and keep a distance from others. Since some people remain contagious beyond the “stay-at-home” period, taking added precautions can lower the chance of spreading respiratory viruses to others.    Follow your employer’s specific return to work policies as the above guidelines may not apply in all settings.     Talk to Your Healthcare Provider    If you test positive for COVID-19, notify your healthcare provider as soon as possible to discuss potential treatment options. Patients who are 65 years or older, immunocompromised, or have other high-risk conditions are candidates for oral antiviral treatment, such as paxlovid and molnupiravir. These treatments, when appropriate, should be started as early as possible to prevent mild symptoms from  becoming severe.     Seek Further Care  If your symptoms do not improve after one week, contact your healthcare provider. If you develop symptoms such as: extreme weakness, difficult breathing, persistent pain or pressure in the chest, or other symptoms that are severe or concerning to you, you should contact your healthcare provider or seek emergency medical attention.     10 Ways to Manage Your Health at Home    Stay home from work, school, and other public places. If you must go out, avoid using any kind of public transportation, ridesharing, or taxis.  If you cannot avoid using public transportation always wear a mask.  Carefully monitor your symptoms. If you are 65 years or older or have a high-risk condition don’t wait for symptoms to become severe to contact your healthcare provider.  If your symptoms get worse, call your healthcare provider immediately.   Get rest and stay hydrated.  If you have a medical appointment, call the healthcare provider ahead of time and tell them that you have or may have COVID-19.  For medical emergencies, call 911 and notify the dispatch personnel that you have or may have COVID-19.  Cover your cough and sneeze.  Wash your hands often with soap and water for at least 20 seconds or clean your hands with an alcohol-based hand  that contains at least 60% alcohol.  As much as possible, stay in a specific room and away from other people in your home. You should use a separate bathroom, if available. If you need to be around other people in or outside of the home, wear a facemask.  Avoid sharing personal items with other people in your household, like dishes, towels, and bedding.  Clean all surfaces that are touched often, like counters, tabletops, and doorknobs. Use household cleaning sprays or wipes according to the label instructions.    Additional Information    You can also get more information at the following websites:    Centers for Disease Control and Prevnetion  (CDC):  https://www.cdc.gov/respiratory-viruses/guidance/respiratory-virus-guidance.html      [facility] [place] of Public Health: [email or web address]/[email or web address]-services/diseases-[email or web address]/respiratory-disease/diseases/covid19.html